# Patient Record
Sex: FEMALE | Race: WHITE | NOT HISPANIC OR LATINO
[De-identification: names, ages, dates, MRNs, and addresses within clinical notes are randomized per-mention and may not be internally consistent; named-entity substitution may affect disease eponyms.]

---

## 2017-04-20 ENCOUNTER — APPOINTMENT (OUTPATIENT)
Dept: PULMONOLOGY | Facility: CLINIC | Age: 52
End: 2017-04-20

## 2017-04-20 VITALS
DIASTOLIC BLOOD PRESSURE: 70 MMHG | BODY MASS INDEX: 19.33 KG/M2 | SYSTOLIC BLOOD PRESSURE: 110 MMHG | HEART RATE: 50 BPM | WEIGHT: 135 LBS | RESPIRATION RATE: 17 BRPM | HEIGHT: 70 IN | OXYGEN SATURATION: 100 %

## 2017-04-20 DIAGNOSIS — Z86.39 PERSONAL HISTORY OF OTHER ENDOCRINE, NUTRITIONAL AND METABOLIC DISEASE: ICD-10-CM

## 2017-04-20 RX ORDER — LEVOCETIRIZINE DIHYDROCHLORIDE 5 MG/1
5 TABLET ORAL
Qty: 90 | Refills: 2 | Status: ACTIVE | COMMUNITY
Start: 2017-04-20 | End: 1900-01-01

## 2017-05-31 ENCOUNTER — RX RENEWAL (OUTPATIENT)
Age: 52
End: 2017-05-31

## 2017-10-09 ENCOUNTER — RX RENEWAL (OUTPATIENT)
Age: 52
End: 2017-10-09

## 2017-11-17 ENCOUNTER — RX RENEWAL (OUTPATIENT)
Age: 52
End: 2017-11-17

## 2017-11-21 ENCOUNTER — APPOINTMENT (OUTPATIENT)
Dept: PULMONOLOGY | Facility: CLINIC | Age: 52
End: 2017-11-21
Payer: COMMERCIAL

## 2017-11-21 VITALS
HEART RATE: 66 BPM | SYSTOLIC BLOOD PRESSURE: 115 MMHG | OXYGEN SATURATION: 100 % | WEIGHT: 136 LBS | BODY MASS INDEX: 19.47 KG/M2 | RESPIRATION RATE: 14 BRPM | DIASTOLIC BLOOD PRESSURE: 65 MMHG | HEIGHT: 70 IN

## 2017-11-21 PROCEDURE — 95012 NITRIC OXIDE EXP GAS DETER: CPT

## 2017-11-21 PROCEDURE — 99214 OFFICE O/P EST MOD 30 MIN: CPT | Mod: 25

## 2017-11-21 PROCEDURE — 94010 BREATHING CAPACITY TEST: CPT

## 2017-11-21 RX ORDER — EPINEPHRINE 0.3 MG/.3ML
0.3 INJECTION INTRAMUSCULAR
Qty: 2 | Refills: 0 | Status: ACTIVE | COMMUNITY
Start: 2017-06-01

## 2017-11-21 RX ORDER — ESTRADIOL 0.1 MG/G
0.1 CREAM VAGINAL
Qty: 42 | Refills: 0 | Status: ACTIVE | COMMUNITY
Start: 2017-05-31

## 2017-11-21 RX ORDER — CEFADROXIL 1000 MG/1
1 TABLET ORAL
Qty: 10 | Refills: 0 | Status: DISCONTINUED | COMMUNITY
Start: 2017-06-05 | End: 2017-11-21

## 2017-11-21 RX ORDER — CEFADROXIL 500 MG/1
500 CAPSULE ORAL
Qty: 14 | Refills: 0 | Status: DISCONTINUED | COMMUNITY
Start: 2017-06-03 | End: 2017-11-21

## 2018-01-31 ENCOUNTER — MEDICATION RENEWAL (OUTPATIENT)
Age: 53
End: 2018-01-31

## 2018-04-18 ENCOUNTER — RX RENEWAL (OUTPATIENT)
Age: 53
End: 2018-04-18

## 2018-04-21 ENCOUNTER — RX RENEWAL (OUTPATIENT)
Age: 53
End: 2018-04-21

## 2018-05-22 ENCOUNTER — APPOINTMENT (OUTPATIENT)
Dept: PULMONOLOGY | Facility: CLINIC | Age: 53
End: 2018-05-22
Payer: COMMERCIAL

## 2018-05-22 ENCOUNTER — NON-APPOINTMENT (OUTPATIENT)
Age: 53
End: 2018-05-22

## 2018-05-22 VITALS
OXYGEN SATURATION: 99 % | SYSTOLIC BLOOD PRESSURE: 110 MMHG | DIASTOLIC BLOOD PRESSURE: 70 MMHG | BODY MASS INDEX: 20.62 KG/M2 | HEIGHT: 70 IN | WEIGHT: 144 LBS | RESPIRATION RATE: 14 BRPM | HEART RATE: 71 BPM

## 2018-05-22 PROCEDURE — 95012 NITRIC OXIDE EXP GAS DETER: CPT

## 2018-05-22 PROCEDURE — 99214 OFFICE O/P EST MOD 30 MIN: CPT | Mod: 25

## 2018-05-22 PROCEDURE — 94010 BREATHING CAPACITY TEST: CPT

## 2018-05-22 RX ORDER — MOMETASONE 50 UG/1
50 SPRAY, METERED NASAL TWICE DAILY
Qty: 3 | Refills: 1 | Status: ACTIVE | COMMUNITY
Start: 2018-05-22 | End: 1900-01-01

## 2018-05-22 RX ORDER — ZILEUTON 600 MG/1
600 TABLET, FILM COATED, EXTENDED RELEASE ORAL
Qty: 180 | Refills: 1 | Status: ACTIVE | COMMUNITY
Start: 2018-05-22 | End: 1900-01-01

## 2018-05-22 RX ORDER — OLOPATADINE HYDROCHLORIDE 665 UG/1
0.6 SPRAY, METERED NASAL
Qty: 3 | Refills: 1 | Status: ACTIVE | COMMUNITY
Start: 2018-05-22 | End: 1900-01-01

## 2018-05-22 RX ORDER — ALBUTEROL SULFATE 90 UG/1
108 (90 BASE) AEROSOL, METERED RESPIRATORY (INHALATION)
Qty: 3 | Refills: 1 | Status: ACTIVE | COMMUNITY
Start: 2018-05-22 | End: 1900-01-01

## 2018-06-08 ENCOUNTER — MEDICATION RENEWAL (OUTPATIENT)
Age: 53
End: 2018-06-08

## 2018-09-07 ENCOUNTER — MEDICATION RENEWAL (OUTPATIENT)
Age: 53
End: 2018-09-07

## 2018-09-26 ENCOUNTER — RX RENEWAL (OUTPATIENT)
Age: 53
End: 2018-09-26

## 2018-12-20 ENCOUNTER — RX RENEWAL (OUTPATIENT)
Age: 53
End: 2018-12-20

## 2019-01-16 ENCOUNTER — APPOINTMENT (OUTPATIENT)
Dept: PULMONOLOGY | Facility: CLINIC | Age: 54
End: 2019-01-16
Payer: COMMERCIAL

## 2019-01-16 ENCOUNTER — NON-APPOINTMENT (OUTPATIENT)
Age: 54
End: 2019-01-16

## 2019-01-16 VITALS
DIASTOLIC BLOOD PRESSURE: 72 MMHG | RESPIRATION RATE: 16 BRPM | HEART RATE: 63 BPM | SYSTOLIC BLOOD PRESSURE: 120 MMHG | WEIGHT: 143 LBS | OXYGEN SATURATION: 99 % | BODY MASS INDEX: 20.47 KG/M2 | HEIGHT: 70 IN

## 2019-01-16 PROCEDURE — 95012 NITRIC OXIDE EXP GAS DETER: CPT

## 2019-01-16 PROCEDURE — 99214 OFFICE O/P EST MOD 30 MIN: CPT | Mod: 25

## 2019-01-16 PROCEDURE — 94010 BREATHING CAPACITY TEST: CPT

## 2019-01-16 RX ORDER — INDACATEROL MALEATE 75 UG/1
75 CAPSULE ORAL; RESPIRATORY (INHALATION)
Qty: 3 | Refills: 1 | Status: DISCONTINUED | COMMUNITY
Start: 2018-05-22 | End: 2019-01-16

## 2019-01-16 RX ORDER — INDACATEROL MALEATE 75 UG/1
75 CAPSULE ORAL; RESPIRATORY (INHALATION)
Qty: 90 | Refills: 1 | Status: DISCONTINUED | COMMUNITY
Start: 2017-04-20 | End: 2019-01-16

## 2019-01-16 NOTE — REVIEW OF SYSTEMS
[Negative] : Sleep Disorder [As Noted in HPI] : as noted in HPI [Itchy Eyes] : itching of ~T the eyes

## 2019-01-16 NOTE — HISTORY OF PRESENT ILLNESS
[FreeTextEntry1] : Ms. Arellano is a 53 year old female presenting to the office for a follow up visit for allergic rhinitis, GERD, low vitamin D, asthma, and poor sleep. Her chief complaint is her sleep.\par -she states she recently returned from [place]\par -she notes her energy level is good\par -she notes her sleep is an issue, but has improved\par -she states her blood sugar is erratic, but has improved\par -she states she had itchy eyes and ears in [place]\par -she states her bowels are regular\par -she states her sinuses were congested in [place], but improved back in [place]\par -she states her diet is normal \par -she states her weight is stable\par -she notes she has been training for 3 months for race\par -she denies any coughing, wheezing, headaches, nausea, vomiting, fever, chills, sweats, chest pain, chest pressure, diarrhea, constipation, dysphagia, dizziness, leg swelling, leg pain, heartburn, reflux, or sour taste in the mouth.

## 2019-01-16 NOTE — ADDENDUM
[FreeTextEntry1] : Documented by Vj Teran acting as a scribe for Dr. Jeremy Jackson on 01/16/2019.\par \par All medical record entries made by the Scribe were at my, Dr. Jeremy Jackson's, direction and personally dictated by me on 01/16/2019. I have reviewed the chart and agree that the record accurately reflects my personal performance of the history, physical exam, assessment and plan. I have also personally directed, reviewed, and agree with the discharge instructions.

## 2019-01-16 NOTE — PROCEDURE
[FreeTextEntry1] : PFT-spi reveals normal flows with FEV1 of   3.14   , which is   95% of predicted, normal flow volume loop\par \par FENO was 17; a normal value being less than 25\par Fractional exhaled nitric oxide (FENO) is regarded as a simple, noninvasive method for assessing eosinophilic airway inflammation. Produced by a variety of cells within the lung, nitric oxide (NO) concentrations are generally low in healthy individuals. However, high concentrations of NO appear to be involved in nonspecific host defense mechanisms and chronic inflammatory diseases such as asthma. The American Thoracic Society (ATS) therefore has recommended using FENO to aid in the diagnosis and monitoring of eosinophilic airway inflammation and asthma, and for identifying steroid responsive individuals whose chronic respiratory symptoms may be caused by airway inflammation.

## 2019-01-16 NOTE — ASSESSMENT
[FreeTextEntry1] : Ms. Arellano is a 53 year old female with a history of asthma, allergy, GERD, and poor sleep doing well from a pulmonary perspective - allergies improved\par \par problem 1: moderate persistent asthma\par -add Utibron 1 puff BID\par -continue to use Xopenex PRN and before exercise\par -continue Alvesco 2 inhalations BID \par -continue Zyflo CR 1200 mg BID \par -Asthma is  believed to be caused by inherited (genetic) and environmental factor, but its exact cause is unknown. Asthma may be triggered by allergens, lung infections, or irritants in the air. Asthma triggers are different for each person \par -Inhaler technique reviewed as well as oral hygiene techniques reviewed with patient. Avoidance of cold air, extremes of temperature, rescue inhaler should be used before exercise. Order of medication reviewed with patient. Recommended use of a cool mist humidifier in the bedroom. \par \par problem 2: allergies\par -continue Claritin 10mg in the morning \par -can add OTC antihistamine PRN (Xyzal 5 mg QHS)\par -continue to use Qnasl 1 sniff each nostril BID\par -continue Olopatadine 0.6% 1 sniff/nostril BID  \par \par -Environmental measures for allergies were encouraged including mattress and pillow cover, air purifier, and environmental controls. \par \par problem 3: GERD\par -diet controlled \par -Rule of 2s: avoid eating too much, eating too late, eating too spicy, eating two hours before bed\par -Things to avoid including overeating, spicy foods, tight clothing, eating within three hours of bed, this list is not all inclusive. \par -For treatment of reflux, possible options discussed including diet control, H2 blockers, PPIs, as well as coating motility agents discussed as treatment options. Timing of meals and proximity of last meal to sleep were discussed. If symptoms persist, a formal gastrointestinal evaluation is needed. \par \par problem 4: low vitamin D\par -continue to use vitamin D therapy \par -Has been associated with asthma exacerbations and increased allergic symptoms. The goal based on recent information is maintaining levels between 50-70 and low normal is 30. Recommended 50,000 units every two weeks to once a month depending on the level. \par \par problem 5: poor sleep\par -recommended to try Sleep Guard by [employer]\par -Good sleep hygiene was encouraged including avoiding watching television an hour before bed, keeping caffeine at a low,  avoiding reading, television, or anything, in bed, no drinking any liquids three hours before bedtime, and only getting into bed when tired and ready for sleep.\par \par problem 6: health maintenance \par -recommended to try optimal electrolytes\par -recommended to take CoQ10 200mg QD \par -s/p influenza vaccine 2018\par -recommended strep pneumonia vaccines: Prevnar-13 vaccine, followed by Pneumo vaccine 23 one year following\par -recommended early intervention for URIs\par -recommended regular osteoporosis evaluations\par -recommended early dermatological evaluations\par -recommended after the age of 50 to the age of 70, colonoscopy every 5 years \par \par F/U in 6 months with [facility]\par She is encouraged to call with any changes, concerns, or questions.

## 2019-01-16 NOTE — REASON FOR VISIT
[Follow-Up] : a follow-up visit [FreeTextEntry1] : asthma, allergic rhinitis, GERD, low vitamin D, asthma, and poor sleep

## 2019-01-28 ENCOUNTER — MEDICATION RENEWAL (OUTPATIENT)
Age: 54
End: 2019-01-28

## 2019-02-18 ENCOUNTER — APPOINTMENT (OUTPATIENT)
Dept: PULMONOLOGY | Facility: CLINIC | Age: 54
End: 2019-02-18
Payer: COMMERCIAL

## 2019-02-18 ENCOUNTER — MEDICATION RENEWAL (OUTPATIENT)
Age: 54
End: 2019-02-18

## 2019-02-18 VITALS
DIASTOLIC BLOOD PRESSURE: 80 MMHG | HEART RATE: 75 BPM | BODY MASS INDEX: 21.18 KG/M2 | WEIGHT: 143 LBS | SYSTOLIC BLOOD PRESSURE: 123 MMHG | OXYGEN SATURATION: 98 % | HEIGHT: 69 IN

## 2019-02-18 PROCEDURE — 99214 OFFICE O/P EST MOD 30 MIN: CPT | Mod: 25

## 2019-02-18 PROCEDURE — 71046 X-RAY EXAM CHEST 2 VIEWS: CPT

## 2019-02-18 RX ORDER — METHYLPREDNISOLONE 4 MG/1
4 TABLET ORAL
Qty: 21 | Refills: 0 | Status: DISCONTINUED | COMMUNITY
Start: 2019-02-17

## 2019-02-18 RX ORDER — LEVALBUTEROL HYDROCHLORIDE 0.63 MG/3ML
0.63 SOLUTION RESPIRATORY (INHALATION)
Qty: 120 | Refills: 3 | Status: ACTIVE | COMMUNITY
Start: 2019-02-18 | End: 1900-01-01

## 2019-02-18 RX ORDER — PRASTERONE 6.5 MG/1
6.5 INSERT VAGINAL
Qty: 28 | Refills: 0 | Status: DISCONTINUED | COMMUNITY
Start: 2018-09-10

## 2019-02-18 RX ORDER — LEVALBUTEROL TARTRATE 45 UG/1
45 AEROSOL, METERED ORAL
Qty: 3 | Refills: 1 | Status: ACTIVE | COMMUNITY
Start: 2019-02-18 | End: 1900-01-01

## 2019-02-18 NOTE — HISTORY OF PRESENT ILLNESS
[FreeTextEntry1] : Ms. Arellano is a 53 year old female presenting to the office for a follow up visit for allergic rhinitis, GERD, low vitamin D, asthma, and poor sleep. Her chief complaint is SOB. \par - Recently she has been fairly active and has been traveling\par - She states that she has not been feeling well\par - She has been placed on Zithromax and nebulizer. \par -She continued to feel poor so she followed up at Urgent Care, where she was placed on prednisone \par - She feels that she is not getting her full breath \par - She has been having PND. \par - Her energy level is around 6-7\par - Her sense of smell and taste are fine \par - She has been using her nebulizer. She notes that it works but it does not last. \par - She denies any headaches, nausea, vomiting, fever, chills, sweats, palpitations, coughing, wheezing, diarrhea, constipation, dysphagia, myalgias, dizziness, leg swelling, leg pain, itchy eyes, itchy ears, heartburn, reflux, or sour taste in the mouth.

## 2019-02-18 NOTE — PROCEDURE
[FreeTextEntry1] : CXR revealed a normal sized heart; there was no evidence of infiltrate or effusion-- A normal chest radiograph.

## 2019-02-18 NOTE — ADDENDUM
[FreeTextEntry1] : Documented by Carrington Moon acting as a scribe for Dr. Jeremy Jackson on 2/18/2019\par \par All medical record entries made by the Scribe were at my, Dr. Jeremy Jackson's, direction and personally dictated by me on 2/18/2019. I have reviewed the chart and agree that the record accurately reflects my personal performance of the history, physical exam, assessment and plan. I have also personally directed, reviewed, and agree with the discharge instructions. \par \par \par \par \par

## 2019-02-18 NOTE — ASSESSMENT
[FreeTextEntry1] : Ms. Arellano is a 53 year old female with a history of asthma, allergy, GERD, and poor sleep. She presents to the office s/p sinusitis in the midst of an asthmatic bronchitis \par \par problem 1: moderate persistent asthma- flair\par - Add a course of prednisone: 20 mg 7 days and 10 mg for 7 days \par Information sheet given to the patient to be reviewed, this medication is never to be used without consulting the prescribing physician. Proper dietary restraint is necessary specifically salt containing foods, if any reaction may occur should be reported. \par \par - Add Xopenex 0.63 via nebulizer TID\par - Cotinue Utibron 1 puff BID\par -continue to use Xopenex PRN and before exercise\par -continue Alvesco 2 inhalations BID \par -continue Zyflo CR 1200 mg BID \par -Asthma is  believed to be caused by inherited (genetic) and environmental factor, but its exact cause is unknown. Asthma may be triggered by allergens, lung infections, or irritants in the air. Asthma triggers are different for each person \par -Inhaler technique reviewed as well as oral hygiene techniques reviewed with patient. Avoidance of cold air, extremes of temperature, rescue inhaler should be used before exercise. Order of medication reviewed with patient. Recommended use of a cool mist humidifier in the bedroom. \par \par problem 2: allergies\par -continue Claritin 10mg in the morning \par -can add OTC antihistamine PRN (Xyzal 5 mg QHS)\par -continue to use Qnasl 1 sniff each nostril BID\par -continue Olopatadine 0.6% 1 sniff/nostril BID  \par \par -Environmental measures for allergies were encouraged including mattress and pillow cover, air purifier, and environmental controls. \par \par problem 3: GERD\par -diet controlled \par -Rule of 2s: avoid eating too much, eating too late, eating too spicy, eating two hours before bed\par -Things to avoid including overeating, spicy foods, tight clothing, eating within three hours of bed, this list is not all inclusive. \par -For treatment of reflux, possible options discussed including diet control, H2 blockers, PPIs, as well as coating motility agents discussed as treatment options. Timing of meals and proximity of last meal to sleep were discussed. If symptoms persist, a formal gastrointestinal evaluation is needed. \par \par problem 4: low vitamin D\par -continue to use vitamin D therapy \par -Has been associated with asthma exacerbations and increased allergic symptoms. The goal based on recent information is maintaining levels between 50-70 and low normal is 30. Recommended 50,000 units every two weeks to once a month depending on the level. \par \par problem 5: poor sleep\par -recommended to try Sleep Guard by [employer]\par -Good sleep hygiene was encouraged including avoiding watching television an hour before bed, keeping caffeine at a low,  avoiding reading, television, or anything, in bed, no drinking any liquids three hours before bedtime, and only getting into bed when tired and ready for sleep.\par \par problem 6: health maintenance \par -recommended to try optimal electrolytes\par -recommended to take CoQ10 200mg QD \par -s/p influenza vaccine 2018\par -recommended strep pneumonia vaccines: Prevnar-13 vaccine, followed by Pneumo vaccine 23 one year following\par -recommended early intervention for URIs\par -recommended regular osteoporosis evaluations\par -recommended early dermatological evaluations\par -recommended after the age of 50 to the age of 70, colonoscopy every 5 years \par \par F/U in 6 months with [facility] MOCTEZU[facility]\par She is encouraged to call with any changes, concerns, or questions.

## 2019-03-21 ENCOUNTER — RX RENEWAL (OUTPATIENT)
Age: 54
End: 2019-03-21

## 2019-05-24 ENCOUNTER — RX RENEWAL (OUTPATIENT)
Age: 54
End: 2019-05-24

## 2019-09-18 ENCOUNTER — APPOINTMENT (OUTPATIENT)
Dept: PULMONOLOGY | Facility: CLINIC | Age: 54
End: 2019-09-18
Payer: COMMERCIAL

## 2019-09-18 ENCOUNTER — TRANSCRIPTION ENCOUNTER (OUTPATIENT)
Age: 54
End: 2019-09-18

## 2019-09-18 ENCOUNTER — NON-APPOINTMENT (OUTPATIENT)
Age: 54
End: 2019-09-18

## 2019-09-18 VITALS
HEART RATE: 60 BPM | RESPIRATION RATE: 17 BRPM | SYSTOLIC BLOOD PRESSURE: 120 MMHG | DIASTOLIC BLOOD PRESSURE: 80 MMHG | HEIGHT: 69 IN | WEIGHT: 142 LBS | OXYGEN SATURATION: 98 % | BODY MASS INDEX: 21.03 KG/M2

## 2019-09-18 PROCEDURE — 99214 OFFICE O/P EST MOD 30 MIN: CPT | Mod: 25

## 2019-09-18 PROCEDURE — 94010 BREATHING CAPACITY TEST: CPT

## 2019-09-18 PROCEDURE — 95012 NITRIC OXIDE EXP GAS DETER: CPT

## 2019-09-18 RX ORDER — PREDNISONE 10 MG/1
10 TABLET ORAL
Qty: 50 | Refills: 0 | Status: DISCONTINUED | COMMUNITY
Start: 2019-02-18 | End: 2019-09-18

## 2019-09-18 NOTE — PHYSICAL EXAM
[General Appearance - Well Developed] : well developed [Well Groomed] : well groomed [Normal Appearance] : normal appearance [General Appearance - Well Nourished] : well nourished [No Deformities] : no deformities [General Appearance - In No Acute Distress] : no acute distress [Normal Conjunctiva] : the conjunctiva exhibited no abnormalities [Eyelids - No Xanthelasma] : the eyelids demonstrated no xanthelasmas [II] : II [Normal Oropharynx] : normal oropharynx [Neck Appearance] : the appearance of the neck was normal [Neck Cervical Mass (___cm)] : no neck mass was observed [Jugular Venous Distention Increased] : there was no jugular-venous distention [Thyroid Diffuse Enlargement] : the thyroid was not enlarged [Thyroid Nodule] : there were no palpable thyroid nodules [Heart Rate And Rhythm] : heart rate and rhythm were normal [Heart Sounds] : normal S1 and S2 [Murmurs] : no murmurs present [Respiration, Rhythm And Depth] : normal respiratory rhythm and effort [Exaggerated Use Of Accessory Muscles For Inspiration] : no accessory muscle use [Auscultation Breath Sounds / Voice Sounds] : lungs were clear to auscultation bilaterally [Abdomen Soft] : soft [Abdomen Tenderness] : non-tender [Abdomen Mass (___ Cm)] : no abdominal mass palpated [Abnormal Walk] : normal gait [Gait - Sufficient For Exercise Testing] : the gait was sufficient for exercise testing [Nail Clubbing] : no clubbing of the fingernails [Cyanosis, Localized] : no localized cyanosis [Petechial Hemorrhages (___cm)] : no petechial hemorrhages [] : no ischemic changes [Skin Color & Pigmentation] : normal skin color and pigmentation [No Venous Stasis] : no venous stasis [Skin Lesions] : no skin lesions [No Skin Ulcers] : no skin ulcer [No Xanthoma] : no  xanthoma was observed [Deep Tendon Reflexes (DTR)] : deep tendon reflexes were 2+ and symmetric [No Focal Deficits] : no focal deficits [Sensation] : the sensory exam was normal to light touch and pinprick [Oriented To Time, Place, And Person] : oriented to person, place, and time [Impaired Insight] : insight and judgment were intact [Affect] : the affect was normal [FreeTextEntry1] : I:E 1:3, clear

## 2019-09-18 NOTE — ADDENDUM
[FreeTextEntry1] : All medical record entries made by marco Hagen were at Dr. Jeremy Jackson's direction and personally dictated by me on 09/18/2019. I have reviewed the chart and agree that the record accurately reflects my personal performance of the history, physical exam, assessment and plan. I have also personally directed, reviewed, and agree with the discharge instructions.

## 2019-09-18 NOTE — PROCEDURE
[FreeTextEntry1] : PFT - spi reveals normal flows; FEV1 is 3.42 which is 108% of predicted, normal flow volume loop \par \par FENO was 16; a normal value being less than 25\par \par Fractional exhaled nitric oxide (FENO) is regarded as a simple, noninvasive method for assessing eosinophilic airway inflammation. Produced by a variety of cells within the lung, nitric oxide (NO) concentrations are generally low in healthy individuals. However, high concentrations of NO appear to be involved in nonspecific host defense mechanisms and chronic inflammatory diseases such as asthma. The American Thoracic Society (ATS) therefore has recommended using FENO to aid in the diagnosis and monitoring of eosinophilic airway inflammation and asthma, and for identifying steroid responsive individuals whose chronic respiratory symptoms may be caused by airway inflammation.

## 2019-09-18 NOTE — HISTORY OF PRESENT ILLNESS
[FreeTextEntry1] : Ms. Arellano is a 53 year old female presenting to the office for a follow up visit for allergic rhinitis, GERD, low vitamin D, asthma, and poor sleep. She offers no chief complaint at this time. \par -She states that she has generally been feeling well \par -she reports that she got a URI toward the end of the Summer, however has been feeling fine since\par -she notes that she has been exercising regularly with swimming in the Summer and running in the Winter\par -she states that her bowels have been regular\par -she reports that her sense of taste and smell have been good\par -her blood sugar / A1c has been under control\par -her allergies have been under control and she has been using Olopatadine regularly/seeing benefits\par -she denies any headaches, nausea, vomiting, fever, chills, sweats, chest pain, chest pressure, diarrhea, constipation, dysphagia, dizziness, leg swelling, leg pain, itchy eyes, itchy ears, heartburn, reflux, or sour taste in the mouth, palpitations.

## 2019-09-18 NOTE — ASSESSMENT
[FreeTextEntry1] : Ms. Arellano is a 53 year old female with a history of asthma, allergy, GERD, and poor sleep. She is currently stable from a pulmonary perspective and feeling well.\par \par problem 1: moderate persistent asthma\par -continue Xopenex 0.63 via nebulizer TID\par -continue Utibron 1 puff BID\par -continue Xopenex 2 puffs Q6H, pre-exercise \par -continue Alvesco 2 inhalations BID \par -continue Zyflo CR 1200 mg BID \par -Asthma is  believed to be caused by inherited (genetic) and environmental factor, but its exact cause is unknown. Asthma may be triggered by allergens, lung infections, or irritants in the air. Asthma triggers are different for each person \par -Inhaler technique reviewed as well as oral hygiene techniques reviewed with patient. Avoidance of cold air, extremes of temperature, rescue inhaler should be used before exercise. Order of medication reviewed with patient. Recommended use of a cool mist humidifier in the bedroom. \par \par problem 2: allergies\par -continue Claritin 10mg in the morning \par -continue OTC antihistamine PRN (Xyzal 5 mg QHS)\par -continue to use Qnasl 1 sniff each nostril BID\par -continue Olopatadine 0.6% 1 sniff/nostril BID  \par \par -Environmental measures for allergies were encouraged including mattress and pillow cover, air purifier, and environmental controls. \par \par problem 3: GERD\par -diet controlled \par -Rule of 2s: avoid eating too much, eating too late, eating too spicy, eating two hours before bed\par -Things to avoid including overeating, spicy foods, tight clothing, eating within three hours of bed, this list is not all inclusive. \par -For treatment of reflux, possible options discussed including diet control, H2 blockers, PPIs, as well as coating motility agents discussed as treatment options. Timing of meals and proximity of last meal to sleep were discussed. If symptoms persist, a formal gastrointestinal evaluation is needed. \par \par problem 4: low vitamin D\par -continue to use vitamin D therapy \par -Has been associated with asthma exacerbations and increased allergic symptoms. The goal based on recent information is maintaining levels between 50-70 and low normal is 30. Recommended 50,000 units every two weeks to once a month depending on the level. \par \par problem 5: poor sleep\par -recommended to try Sleep Guard by [employer]\par -Good sleep hygiene was encouraged including avoiding watching television an hour before bed, keeping caffeine at a low,  avoiding reading, television, or anything, in bed, no drinking any liquids three hours before bedtime, and only getting into bed when tired and ready for sleep.\par \par problem 6: health maintenance \par -recommended to try optimal electrolytes\par -recommended to take CoQ10 200mg QD \par -s/p influenza vaccine 2018\par -recommended strep pneumonia vaccines: Prevnar-13 vaccine, followed by Pneumo vaccine 23 one year following\par -recommended early intervention for URIs\par -recommended regular osteoporosis evaluations\par -recommended early dermatological evaluations\par -recommended after the age of 50 to the age of 70, colonoscopy every 5 years \par \par F/U in 6 months with [facility]\par She is encouraged to call with any changes, concerns, or questions.

## 2019-09-26 ENCOUNTER — RESULT REVIEW (OUTPATIENT)
Age: 54
End: 2019-09-26

## 2019-11-23 ENCOUNTER — RX RENEWAL (OUTPATIENT)
Age: 54
End: 2019-11-23

## 2019-12-27 ENCOUNTER — RX RENEWAL (OUTPATIENT)
Age: 54
End: 2019-12-27

## 2020-01-14 ENCOUNTER — RX RENEWAL (OUTPATIENT)
Age: 55
End: 2020-01-14

## 2020-01-27 ENCOUNTER — NON-APPOINTMENT (OUTPATIENT)
Age: 55
End: 2020-01-27

## 2020-01-27 ENCOUNTER — APPOINTMENT (OUTPATIENT)
Dept: PULMONOLOGY | Facility: CLINIC | Age: 55
End: 2020-01-27
Payer: COMMERCIAL

## 2020-01-27 VITALS
DIASTOLIC BLOOD PRESSURE: 80 MMHG | OXYGEN SATURATION: 98 % | WEIGHT: 143 LBS | BODY MASS INDEX: 21.18 KG/M2 | SYSTOLIC BLOOD PRESSURE: 110 MMHG | HEART RATE: 68 BPM | RESPIRATION RATE: 17 BRPM | HEIGHT: 69 IN

## 2020-01-27 DIAGNOSIS — R09.89 OTHER SPECIFIED SYMPTOMS AND SIGNS INVOLVING THE CIRCULATORY AND RESPIRATORY SYSTEMS: ICD-10-CM

## 2020-01-27 PROCEDURE — 99214 OFFICE O/P EST MOD 30 MIN: CPT | Mod: 25

## 2020-01-27 PROCEDURE — 94010 BREATHING CAPACITY TEST: CPT

## 2020-01-27 PROCEDURE — 71046 X-RAY EXAM CHEST 2 VIEWS: CPT

## 2020-01-27 RX ORDER — LEVALBUTEROL HYDROCHLORIDE 0.63 MG/3ML
0.63 SOLUTION RESPIRATORY (INHALATION)
Qty: 1 | Refills: 2 | Status: ACTIVE | COMMUNITY
Start: 2020-01-27 | End: 1900-01-01

## 2020-01-27 NOTE — PROCEDURE
[FreeTextEntry1] : CXR in office; Read by Dr. Jackson. \par Impression: Normal appearing heart. No infiltrates, effusion or opacities noted. Mild scoliosis appreciated.\par \par SPI: FEV1 - 91%\par

## 2020-01-27 NOTE — HISTORY OF PRESENT ILLNESS
[TextBox_4] : Ms. Arellano is a 54 year old female presenting to the office for an acute visit.\par She has history of allergic rhinitis, GERD, low vitamin D, asthma, and poor sleep.\par \par Her CC is productive cough x 7 days.\par Pt states that she has been sick all of January.  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  She started out with a stomach virus when in [place], that has since resolved, but now has developed a chest infection that started on 1/21 when she left for [place].\par Pt. returned last night and started on a Z-Pack as RX'ed from another Provider. Today is day 2; she took 250 mg today.\par Pt. states she had a fever x 1 night on 1/25, but not since.  She continues to cough up mucus that is green-yellow in color.  She notes when she coughs it's coughing fits, her throat feels irritated because of it. She also c/o chest congestion, nasal congestion, fatigue, decreased appetite, and post nasal drip. She has had some recently CROW, but states when she was in [place] the elevator for the Tube was broken and she had to walk up 100+ stairs resulting in the dyspnea episode.\par \par She is currently on Utibron and Alvesco.  She has not used her Xopenex rescue inhaler recently. \par She denies wheezing, SOB @ rest, sinus pressure/congestion, palpitations, dizziness or N/V/D.

## 2020-01-27 NOTE — PHYSICAL EXAM
[No Acute Distress] : no acute distress [Well Nourished] : well nourished [No Deformities] : no deformities [Well Developed] : well developed [Normal Oropharynx] : normal oropharynx [Normal Appearance] : normal appearance [Normal Rate/Rhythm] : normal rate/rhythm [Normal S1, S2] : normal s1, s2 [No Murmurs] : no murmurs [No Resp Distress] : no resp distress [No Abnormalities] : no abnormalities [Normal Gait] : normal gait [No Clubbing] : no clubbing [No Edema] : no edema [Normal Color/ Pigmentation] : normal color/ pigmentation [Oriented x3] : oriented x3 [Normal Affect] : normal affect [TextBox_68] : RLL Forced expiratory wheeze auscultated. All other lobes CTA.

## 2020-01-27 NOTE — ASSESSMENT
[FreeTextEntry1] : The plan for the patient is as follows:\par \par 1. Acute Bronchitis\par - Meliton another 250mg from pre-RX'ed Z-Pack to total dose of 500 mg today.\par - Then Discontinue Z-Pack; add Azithromycin 500 mg PO x 3 more days.\par \par 2. Moderate, persistent asthma:\par - Add use of Xopenex inhaler 2 puffs Q6H, especially before ICS inhaler for the next week.\par - Continue Utibron neohaler; 1 puff BID.\par - Pt. states Alvesco inhaler is no longer covered by insurance - Pt. to add QVAr 2 puffs BID; rinse and gargle after use.  Patient to contact office with list of ICS her insurance will cover - if QVar isn't covered. \par \par 3. Chest Congestion\par - Add Mucinex OTC Q12H \par \par 4. Cough\par - CXR in office WNL.\par \par 5. Post-nasal drip\par - Continue Olopatadine 0.6 nasal spray; 1 spray each nostril BID. \par \par

## 2020-01-27 NOTE — REVIEW OF SYSTEMS
[Fever] : fever [Fatigue] : fatigue [Poor Appetite] : poor appetite [Nasal Congestion] : nasal congestion [Postnasal Drip] : postnasal drip [Cough] : cough [Sputum] : sputum [Dyspnea] : dyspnea [Negative] : Endocrine [Sore Throat] : no sore throat [Sinus Problems] : no sinus problems [Chest Tightness] : no chest tightness [Wheezing] : no wheezing

## 2020-02-06 ENCOUNTER — TRANSCRIPTION ENCOUNTER (OUTPATIENT)
Age: 55
End: 2020-02-06

## 2020-02-06 RX ORDER — PREDNISONE 10 MG/1
10 TABLET ORAL
Qty: 60 | Refills: 0 | Status: DISCONTINUED | COMMUNITY
Start: 2020-02-05 | End: 2020-02-06

## 2020-02-25 ENCOUNTER — TRANSCRIPTION ENCOUNTER (OUTPATIENT)
Age: 55
End: 2020-02-25

## 2020-03-18 ENCOUNTER — APPOINTMENT (OUTPATIENT)
Dept: PULMONOLOGY | Facility: CLINIC | Age: 55
End: 2020-03-18
Payer: COMMERCIAL

## 2020-03-18 VITALS
HEIGHT: 69 IN | SYSTOLIC BLOOD PRESSURE: 102 MMHG | RESPIRATION RATE: 17 BRPM | BODY MASS INDEX: 21.09 KG/M2 | TEMPERATURE: 97.7 F | HEART RATE: 57 BPM | OXYGEN SATURATION: 99 % | WEIGHT: 142.4 LBS | DIASTOLIC BLOOD PRESSURE: 60 MMHG

## 2020-03-18 DIAGNOSIS — Z87.09 PERSONAL HISTORY OF OTHER DISEASES OF THE RESPIRATORY SYSTEM: ICD-10-CM

## 2020-03-18 PROCEDURE — 99214 OFFICE O/P EST MOD 30 MIN: CPT | Mod: 25

## 2020-03-18 RX ORDER — AZITHROMYCIN 500 MG/1
500 TABLET, FILM COATED ORAL DAILY
Qty: 3 | Refills: 0 | Status: DISCONTINUED | COMMUNITY
Start: 2020-01-27 | End: 2020-03-18

## 2020-03-18 RX ORDER — CICLESONIDE 160 UG/1
160 AEROSOL, METERED RESPIRATORY (INHALATION) TWICE DAILY
Qty: 3 | Refills: 1 | Status: ACTIVE | COMMUNITY
Start: 2020-03-18 | End: 1900-01-01

## 2020-03-18 NOTE — ASSESSMENT
[FreeTextEntry1] : Ms. Arellano is a 55 year old female with a history of asthma, allergy, GERD, DM, Hypothyroid, and poor sleep. She is currently stable from a pulmonary perspective and feeling well. s/p Bronchitis. \par \par problem 1: moderate persistent asthma\par -continue Xopenex 0.63 via nebulizer TID\par -continue Utibron 1 puff BID\par -continue Xopenex 2 puffs Q6H, pre-exercise \par -continue Qvar 80mg 2 inhalations BID (better w/ Alvesco) \par -continue Zyflo CR 1200 mg BID \par -Asthma is  believed to be caused by inherited (genetic) and environmental factor, but its exact cause is unknown. Asthma may be triggered by allergens, lung infections, or irritants in the air. Asthma triggers are different for each person \par -Inhaler technique reviewed as well as oral hygiene techniques reviewed with patient. Avoidance of cold air, extremes of temperature, rescue inhaler should be used before exercise. Order of medication reviewed with patient. Recommended use of a cool mist humidifier in the bedroom. \par \par problem 2: allergies (stable) \par -continue Claritin 10mg in the morning \par -continue OTC antihistamine PRN (Xyzal 5 mg QHS)\par -continue to use Qnasl 1 sniff each nostril BID\par -continue Olopatadine 0.6% 1 sniff/nostril BID  \par \par -Environmental measures for allergies were encouraged including mattress and pillow cover, air purifier, and environmental controls. \par \par problem 3: GERD\par -diet controlled \par -Rule of 2s: avoid eating too much, eating too late, eating too spicy, eating two hours before bed\par -Things to avoid including overeating, spicy foods, tight clothing, eating within three hours of bed, this list is not all inclusive. \par -For treatment of reflux, possible options discussed including diet control, H2 blockers, PPIs, as well as coating motility agents discussed as treatment options. Timing of meals and proximity of last meal to sleep were discussed. If symptoms persist, a formal gastrointestinal evaluation is needed. \par \par problem 4: low vitamin D\par -continue to use vitamin D therapy \par -Has been associated with asthma exacerbations and increased allergic symptoms. The goal based on recent information is maintaining levels between 50-70 and low normal is 30. Recommended 50,000 units every two weeks to once a month depending on the level. \par \par problem 5: poor sleep\par -recommended to try Sleep Guard by [employer]\par -Good sleep hygiene was encouraged including avoiding watching television an hour before bed, keeping caffeine at a low,  avoiding reading, television, or anything, in bed, no drinking any liquids three hours before bedtime, and only getting into bed when tired and ready for sleep.\par \par problem 6: health maintenance \par -recommended to try optimal electrolytes\par -recommended to take CoQ10 200mg QD \par -s/p influenza vaccine 2019\par -recommended strep pneumonia vaccines: Prevnar-13 vaccine, followed by Pneumo vaccine 23 one year following\par -recommended early intervention for URIs\par -recommended regular osteoporosis evaluations\par -recommended early dermatological evaluations\par -recommended after the age of 50 to the age of 70, colonoscopy every 5 years \par \par F/U in 6 months with [facility]\par She is encouraged to call with any changes, concerns, or questions.

## 2020-03-18 NOTE — ADDENDUM
[FreeTextEntry1] : Documented by Ronnie Meeks acting as a scribe for Dr. Jeremy Jackson on 03/18/2020 \par \par All medical record entries made by the Scribe were at my, Dr. Jeremy Jackson's, direction and personally dictated by me on 03/18/2020 . I have reviewed the chart and agree that the record accurately reflects my personal performance of the history, physical exam, assessment and plan. I have also personally directed, reviewed, and agree with the discharge instructions.

## 2020-03-18 NOTE — HISTORY OF PRESENT ILLNESS
[FreeTextEntry1] : Ms. Arellano is a 55 year old female presenting to the office for a follow up visit for allergic rhinitis, GERD, low vitamin D, asthma, and poor sleep. Her chief complaint is \par -has been feeling better after her recent illness. \par -he reports that she is not getting enough sleep due to the stress load. \par -she notes that the Qvar is not working as well as the Alvesco.\par -sinuses are good but with the allergy season coming up, they might become active. \par -She reports that She is not using any new medication, vitamins, or supplements. \par \par -She denies any chest pain, chest pressure, diarrhea, constipation, dysphagia, dizziness, sour taste in the mouth, leg swelling, leg pain, itchy eyes, itchy ears, heartburn, reflux, myalgias or arthralgias.

## 2020-03-23 ENCOUNTER — TRANSCRIPTION ENCOUNTER (OUTPATIENT)
Age: 55
End: 2020-03-23

## 2020-03-24 RX ORDER — CICLESONIDE 160 UG/1
160 AEROSOL, METERED RESPIRATORY (INHALATION)
Qty: 18.3 | Refills: 1 | Status: DISCONTINUED | COMMUNITY
Start: 2017-04-20 | End: 2020-03-24

## 2020-04-30 ENCOUNTER — RX RENEWAL (OUTPATIENT)
Age: 55
End: 2020-04-30

## 2020-09-24 ENCOUNTER — APPOINTMENT (OUTPATIENT)
Dept: PULMONOLOGY | Facility: CLINIC | Age: 55
End: 2020-09-24
Payer: COMMERCIAL

## 2020-09-24 VITALS
DIASTOLIC BLOOD PRESSURE: 70 MMHG | WEIGHT: 145 LBS | TEMPERATURE: 97.1 F | HEART RATE: 56 BPM | OXYGEN SATURATION: 97 % | SYSTOLIC BLOOD PRESSURE: 120 MMHG | BODY MASS INDEX: 21.48 KG/M2 | RESPIRATION RATE: 17 BRPM | HEIGHT: 69 IN

## 2020-09-24 PROCEDURE — 99214 OFFICE O/P EST MOD 30 MIN: CPT | Mod: 25

## 2020-09-24 PROCEDURE — 95012 NITRIC OXIDE EXP GAS DETER: CPT

## 2020-09-24 NOTE — PROCEDURE
[FreeTextEntry1] :  FENO was 35; normal value being less than 25\par Fractional exhaled nitric oxide (FENO) is regarded as a simple, noninvasive method for assessing eosinophilic airway inflammation. Produced by a variety of cells within the lung, nitric oxide (NO) concentrations are generally low in healthy individuals. However, high concentrations of NO appear to be involved in nonspecific host defense mechanisms and chronic inflammatory diseases such as asthma. The American Thoracic Society (ATS) therefore has recommended using FENO to aid in the diagnosis and monitoring of eosinophilic airway inflammation and asthma, and for identifying steroid responsive individuals whose chronic respiratory symptoms may be airway inflammation.\par

## 2020-09-24 NOTE — ADDENDUM
[FreeTextEntry1] : Documented by Yasmin Horowitz acting as a scribe for Dr. Jeremy Jackson on 09/24/2020 \par \par All medical record entries made by the Scribe were at my, Dr. Jeremy Jackson's, direction and personally dictated by me on 09/24/2020 . I have reviewed the chart and agree that the record accurately reflects my personal performance of the history, physical exam, assessment and plan. I have also personally directed, reviewed, and agree with the discharge instructions.

## 2020-09-24 NOTE — HISTORY OF PRESENT ILLNESS
[FreeTextEntry1] : Ms. Arellano is a 55 year old female presenting to the office for a follow up visit for allergic rhinitis, GERD, low vitamin D, asthma, and poor sleep. Her chief complaint is \par - she has been feeling good, except every time she goes running her nose starts to drip. \par - no chest pain / pressure\par - bowel movements are regular\par - weight is stable \par - blood sugar is good \par - her sleep has been good \par - she has been biking, running, and paddle boarding whenever she can for exercise. \par - denies taking any new medications, vitamins, or supplements. ; except [place] Utibron [place] the [place] so she needs a different medication for that. \par - she has been using olopatadine\par - She  denies any visual issues, headaches, nausea, vomiting, fever, chills, sweats, chest pains, chest pressure, diarrhea, constipation, dysphagia, myalgia, dizziness, leg swelling, leg pain, itchy eyes, itchy ears, heartburn, reflux, or sour taste in the mouth.

## 2020-09-24 NOTE — ASSESSMENT
[FreeTextEntry1] : Ms. Arellano is a 55 year old female with a history of asthma, allergy, GERD, DM, Hypothyroid, and poor sleep. She is currently stable from a pulmonary perspective and feeling well.\par \par problem 1: moderate persistent asthma\par -continue Xopenex 0.63 via nebulizer TID\par -change Utibron 1 puff BID - to Stiolto 1 q day \par -continue Xopenex 2 puffs Q6H, pre-exercise \par -continue Qvar 80mg 2 inhalations BID (better w/ Alvesco) \par -continue Zyflo CR 1200 mg BID \par -Asthma is  believed to be caused by inherited (genetic) and environmental factor, but its exact cause is unknown. Asthma may be triggered by allergens, lung infections, or irritants in the air. Asthma triggers are different for each person \par -Inhaler technique reviewed as well as oral hygiene techniques reviewed with patient. Avoidance of cold air, extremes of temperature, rescue inhaler should be used before exercise. Order of medication reviewed with patient. Recommended use of a cool mist humidifier in the bedroom. \par \par problem 2: allergies (Active)\par -continue Claritin 10mg in the morning \par -continue OTC antihistamine PRN (Xyzal 5 mg QHS)\par -continue to use Qnasl 1 sniff each nostril BID\par -continue Olopatadine 0.6% 1 sniff/nostril BID  \par \par -Environmental measures for allergies were encouraged including mattress and pillow cover, air purifier, and environmental controls. \par \par problem 3: GERD\par -diet controlled \par -Rule of 2s: avoid eating too much, eating too late, eating too spicy, eating two hours before bed\par -Things to avoid including overeating, spicy foods, tight clothing, eating within three hours of bed, this list is not all inclusive. \par -For treatment of reflux, possible options discussed including diet control, H2 blockers, PPIs, as well as coating motility agents discussed as treatment options. Timing of meals and proximity of last meal to sleep were discussed. If symptoms persist, a formal gastrointestinal evaluation is needed. \par \par problem 4: low vitamin D\par -continue to use vitamin D therapy \par -Has been associated with asthma exacerbations and increased allergic symptoms. The goal based on recent information is maintaining levels between 50-70 and low normal is 30. Recommended 50,000 units every two weeks to once a month depending on the level. \par \par problem 5: poor sleep\par -recommended to try Sleep Guard by [employer]\par -Good sleep hygiene was encouraged including avoiding watching television an hour before bed, keeping caffeine at a low,  avoiding reading, television, or anything, in bed, no drinking any liquids three hours before bedtime, and only getting into bed when tired and ready for sleep.\par \par problem 6: health maintenance \par -recommended to try optimal electrolytes\par -recommended to take CoQ10 200mg QD \par -s/p influenza vaccine 2020\par -recommended strep pneumonia vaccines: Prevnar-13 vaccine, followed by Pneumo vaccine 23 one year following\par -recommended early intervention for URIs\par -recommended regular osteoporosis evaluations\par -recommended early dermatological evaluations\par -recommended after the age of 50 to the age of 70, colonoscopy every 5 years \par \par F/U in 6 months with [facility]\par She is encouraged to call with any changes, concerns, or questions.

## 2020-12-25 ENCOUNTER — RX RENEWAL (OUTPATIENT)
Age: 55
End: 2020-12-25

## 2021-02-22 ENCOUNTER — TRANSCRIPTION ENCOUNTER (OUTPATIENT)
Age: 56
End: 2021-02-22

## 2021-03-10 DIAGNOSIS — Z01.812 ENCOUNTER FOR PREPROCEDURAL LABORATORY EXAMINATION: ICD-10-CM

## 2021-03-16 LAB — SARS-COV-2 N GENE NPH QL NAA+PROBE: NOT DETECTED

## 2021-03-19 ENCOUNTER — NON-APPOINTMENT (OUTPATIENT)
Age: 56
End: 2021-03-19

## 2021-03-19 ENCOUNTER — APPOINTMENT (OUTPATIENT)
Dept: PULMONOLOGY | Facility: CLINIC | Age: 56
End: 2021-03-19
Payer: COMMERCIAL

## 2021-03-19 VITALS
BODY MASS INDEX: 21.77 KG/M2 | DIASTOLIC BLOOD PRESSURE: 76 MMHG | HEART RATE: 68 BPM | OXYGEN SATURATION: 98 % | WEIGHT: 147 LBS | RESPIRATION RATE: 17 BRPM | HEIGHT: 69 IN | SYSTOLIC BLOOD PRESSURE: 116 MMHG | TEMPERATURE: 97.3 F

## 2021-03-19 PROCEDURE — 95012 NITRIC OXIDE EXP GAS DETER: CPT

## 2021-03-19 PROCEDURE — 94010 BREATHING CAPACITY TEST: CPT

## 2021-03-19 PROCEDURE — 94729 DIFFUSING CAPACITY: CPT

## 2021-03-19 PROCEDURE — 99214 OFFICE O/P EST MOD 30 MIN: CPT | Mod: 25

## 2021-03-19 PROCEDURE — 94727 GAS DIL/WSHOT DETER LNG VOL: CPT

## 2021-03-19 PROCEDURE — 99072 ADDL SUPL MATRL&STAF TM PHE: CPT

## 2021-03-19 RX ORDER — LEVALBUTEROL TARTRATE 45 UG/1
45 AEROSOL, METERED ORAL
Qty: 3 | Refills: 1 | Status: ACTIVE | COMMUNITY
Start: 2021-03-19 | End: 1900-01-01

## 2021-03-19 NOTE — PROCEDURE
[FreeTextEntry1] : Feno was 15 ; a normal value being less than 25. Fractional exhaled nitric oxide (FENO) is regarded as a simple, noninvasive method for assessing eosinophilic airway inflammation. Produced by a variety of cells within the lung, nitric oxide (NO) concentrations are generally low in healthy individuals. However, high concentrations of NO appear to be involved in nonspecific host defense mechanisms and chronic inflammatory  diseases such as asthma. The American Thoracic Society (ATS) therefore recommended using FENO to aid in the diagnosis and monitoring of eosinophilic airway inflammation and asthma, and for identifying steroid responsive individuals whose chronic respiratory symptoms may be caused by airway inflammation \par \par PFT revealed normal flows, with a FEV1 of 3.16L, which is 89% of predicted, with a normal flow volume loop\par  \par \par -Images and procedures reviewed in detail and discussed with patient.

## 2021-03-19 NOTE — HISTORY OF PRESENT ILLNESS
[FreeTextEntry1] : Ms. Arellano is a 56 year old female presenting to the office for a follow up visit for allergic rhinitis, GERD, low vitamin D, asthma, and poor sleep. Her chief complaint is \par -She notes having issues running with her energy level, feeling SOB on inclines \par - S/p Covid 19 vaccine (Pfizer) \par -She notes having some indigestion that is intermittent, which will be active for 2-3 days then will disappear\par -She notes her sleep is inconsistent, occasionally having issues getting to sleep\par -She notes her weight is stable \par -She notes her sinuses are drippy \par -she notes using Xopenex before she runs \par -\par \par - patient denies any headaches, nausea, vomiting, fever, chills, sweats, chest pain, chest pressure, palpitations,coughing, wheezing, fatigue, diarrhea, constipation, dysphagia, myalgias, dizziness, leg swelling, leg pain, itchy eyes, itchy ears, heartburn, reflux or sour taste in the mouth

## 2021-03-19 NOTE — ADDENDUM
[FreeTextEntry1] : Documented by Ibrahima Aviles acting as a scribe for Dr. Jeremy Jackson on (03/19/2021).\par \par All medical record entries made by the Scribe were at my, Dr. Jeremy Jackson's, direction and personally dictated by me on (03/19/2021). I have reviewed the chart and agree that the record accurately reflects my personal performance of the history, physical exam, assessment and plan. I have also personally directed, reviewed, and agree with the discharge instructions.\par

## 2021-03-19 NOTE — PHYSICAL EXAM
[No Acute Distress] : no acute distress [Normal Oropharynx] : normal oropharynx [III] : Mallampati Class: III [Normal Appearance] : normal appearance [No Neck Mass] : no neck mass [Normal Rate/Rhythm] : normal rate/rhythm [Normal S1, S2] : normal s1, s2 [No Murmurs] : no murmurs [No Resp Distress] : no resp distress [Clear to Auscultation Bilaterally] : clear to auscultation bilaterally [No Abnormalities] : no abnormalities [Benign] : benign [Normal Gait] : normal gait [No Clubbing] : no clubbing [No Cyanosis] : no cyanosis [No Edema] : no edema [FROM] : FROM [Normal Color/ Pigmentation] : normal color/ pigmentation [No Focal Deficits] : no focal deficits [Oriented x3] : oriented x3 [Normal Affect] : normal affect [TextBox_68] : I:E 1:3; Clear

## 2021-03-19 NOTE — ASSESSMENT
[FreeTextEntry1] : Ms. Arellano is a 55 year old female with a history of asthma, allergy, GERD, DM, Hypothyroid, and poor sleep. She is currently stable from a pulmonary perspective and mildly symptomatic\par \par problem 1: moderate persistent asthma\par -continue Xopenex 0.63 via nebulizer TID\par -change Stiolto/Qvar to Add Breztri 2 puffs BID \par -continue Xopenex 2 puffs Q6H, pre-exercise \par -continue Zyflo CR 1200 mg BID \par -Asthma is  believed to be caused by inherited (genetic) and environmental factor, but its exact cause is unknown. Asthma may be triggered by allergens, lung infections, or irritants in the air. Asthma triggers are different for each person \par -Inhaler technique reviewed as well as oral hygiene techniques reviewed with patient. Avoidance of cold air, extremes of temperature, rescue inhaler should be used before exercise. Order of medication reviewed with patient. Recommended use of a cool mist humidifier in the bedroom. \par \par problem 2: allergies (quiet)\par -continue Claritin 10mg in the morning \par -continue OTC antihistamine PRN (Xyzal 5 mg QHS)\par -continue to use Qnasl 1 sniff each nostril BID\par -continue Olopatadine 0.6% 1 sniff/nostril BID  \par \par -Environmental measures for allergies were encouraged including mattress and pillow cover, air purifier, and environmental controls. \par \par problem 3: GERD\par -diet controlled \par -Rule of 2s: avoid eating too much, eating too late, eating too spicy, eating two hours before bed\par -Things to avoid including overeating, spicy foods, tight clothing, eating within three hours of bed, this list is not all inclusive. \par -For treatment of reflux, possible options discussed including diet control, H2 blockers, PPIs, as well as coating motility agents discussed as treatment options. Timing of meals and proximity of last meal to sleep were discussed. If symptoms persist, a formal gastrointestinal evaluation is needed. \par \par problem 4: low vitamin D\par -continue to use vitamin D therapy \par -Has been associated with asthma exacerbations and increased allergic symptoms. The goal based on recent information is maintaining levels between 50-70 and low normal is 30. Recommended 50,000 units every two weeks to once a month depending on the level. \par \par problem 5: poor sleep\par -recommended to try Sleep Guard by [employer]\par -Good sleep hygiene was encouraged including avoiding watching television an hour before bed, keeping caffeine at a low,  avoiding reading, television, or anything, in bed, no drinking any liquids three hours before bedtime, and only getting into bed when tired and ready for sleep.\par \par problem 6: health maintenance \par - S/p Covid 19 vaccine (Pfizer) \par -recommended to try optimal electrolytes\par -recommended to take CoQ10 200mg QD \par -s/p influenza vaccine 2020\par -recommended strep pneumonia vaccines: Prevnar-13 vaccine, followed by Pneumo vaccine 23 one year following\par -recommended early intervention for URIs\par -recommended regular osteoporosis evaluations\par -recommended early dermatological evaluations\par -recommended after the age of 50 to the age of 70, colonoscopy every 5 years \par \par F/U in 6 months with [facility]\par She is encouraged to call with any changes, concerns, or questions.

## 2021-03-30 ENCOUNTER — TRANSCRIPTION ENCOUNTER (OUTPATIENT)
Age: 56
End: 2021-03-30

## 2021-03-30 RX ORDER — TIOTROPIUM BROMIDE AND OLODATEROL 3.124; 2.736 UG/1; UG/1
2.5-2.5 SPRAY, METERED RESPIRATORY (INHALATION) DAILY
Qty: 3 | Refills: 1 | Status: DISCONTINUED | COMMUNITY
Start: 2020-09-24 | End: 2021-03-30

## 2021-03-31 ENCOUNTER — RX RENEWAL (OUTPATIENT)
Age: 56
End: 2021-03-31

## 2021-06-08 ENCOUNTER — RX RENEWAL (OUTPATIENT)
Age: 56
End: 2021-06-08

## 2021-06-24 ENCOUNTER — RX RENEWAL (OUTPATIENT)
Age: 56
End: 2021-06-24

## 2021-07-25 ENCOUNTER — RX RENEWAL (OUTPATIENT)
Age: 56
End: 2021-07-25

## 2021-07-26 ENCOUNTER — TRANSCRIPTION ENCOUNTER (OUTPATIENT)
Age: 56
End: 2021-07-26

## 2021-09-15 LAB — SARS-COV-2 N GENE NPH QL NAA+PROBE: NOT DETECTED

## 2021-09-17 ENCOUNTER — NON-APPOINTMENT (OUTPATIENT)
Age: 56
End: 2021-09-17

## 2021-09-17 ENCOUNTER — APPOINTMENT (OUTPATIENT)
Dept: PULMONOLOGY | Facility: CLINIC | Age: 56
End: 2021-09-17
Payer: COMMERCIAL

## 2021-09-17 VITALS
HEART RATE: 60 BPM | HEIGHT: 69 IN | SYSTOLIC BLOOD PRESSURE: 112 MMHG | RESPIRATION RATE: 17 BRPM | WEIGHT: 144 LBS | TEMPERATURE: 97 F | DIASTOLIC BLOOD PRESSURE: 76 MMHG | BODY MASS INDEX: 21.33 KG/M2 | OXYGEN SATURATION: 99 %

## 2021-09-17 PROCEDURE — 99214 OFFICE O/P EST MOD 30 MIN: CPT | Mod: 25

## 2021-09-17 PROCEDURE — 95012 NITRIC OXIDE EXP GAS DETER: CPT

## 2021-09-17 PROCEDURE — 94010 BREATHING CAPACITY TEST: CPT

## 2021-09-17 RX ORDER — ZOSTER VACCINE RECOMBINANT, ADJUVANTED 50 MCG/0.5
50 KIT INTRAMUSCULAR
Qty: 2 | Refills: 0 | Status: ACTIVE | COMMUNITY
Start: 2021-09-17 | End: 1900-01-01

## 2021-09-17 NOTE — PROCEDURE
[FreeTextEntry1] : Feno was 17; a normal value being less than 25. Fractional exhaled nitric oxide (FENO) is regarded as a simple, noninvasive method for assessing eosinophilic airway inflammation. Produced by a variety of cells within the lung, nitric oxide (NO) concentrations are generally low in healthy individuals. However, high concentrations of NO appear to be involved in nonspecific host defense mechanisms and chronic inflammatory  diseases such as asthma. The American Thoracic Society (ATS) therefore recommended using FENO to aid in the diagnosis and monitoring of eosinophilic airway inflammation and asthma, and for identifying steroid responsive individuals whose chronic respiratory symptoms may be caused by airway inflammation \par \par PFT revealed normal flows, with a FEV1 of 3.28L, which is 105% of predicted, with a normal flow volume loop\par

## 2021-09-17 NOTE — ASSESSMENT
[FreeTextEntry1] : Ms. Arellano is a 56 year old female with a history of asthma, allergy, GERD, DM, Hypothyroid, and poor sleep (improved). She is currently stable from a pulmonary perspective and stable\par \par problem 1: moderate persistent asthma\par -continue Xopenex 0.63 via nebulizer TID\par -change Stiolto/Qvar to Add Breztri 2 puffs BID \par -continue Xopenex 2 puffs Q6H, pre-exercise \par -continue Zyflo CR 1200 mg BID \par -Asthma is  believed to be caused by inherited (genetic) and environmental factor, but its exact cause is unknown. Asthma may be triggered by allergens, lung infections, or irritants in the air. Asthma triggers are different for each person \par -Inhaler technique reviewed as well as oral hygiene techniques reviewed with patient. Avoidance of cold air, extremes of temperature, rescue inhaler should be used before exercise. Order of medication reviewed with patient. Recommended use of a cool mist humidifier in the bedroom. \par \par problem 2: allergies (quiet)\par -continue Claritin 10mg in the morning \par -continue OTC antihistamine PRN (Xyzal 5 mg QHS)\par -continue to use Qnasl 1 sniff each nostril BID\par -continue Olopatadine 0.6% 1 sniff/nostril BID  \par \par -Environmental measures for allergies were encouraged including mattress and pillow cover, air purifier, and environmental controls. \par \par problem 3: GERD\par -diet controlled \par -Rule of 2s: avoid eating too much, eating too late, eating too spicy, eating two hours before bed\par -Things to avoid including overeating, spicy foods, tight clothing, eating within three hours of bed, this list is not all inclusive. \par -For treatment of reflux, possible options discussed including diet control, H2 blockers, PPIs, as well as coating motility agents discussed as treatment options. Timing of meals and proximity of last meal to sleep were discussed. If symptoms persist, a formal gastrointestinal evaluation is needed. \par \par problem 4: low vitamin D\par -continue to use vitamin D therapy \par -Has been associated with asthma exacerbations and increased allergic symptoms. The goal based on recent information is maintaining levels between 50-70 and low normal is 30. Recommended 50,000 units every two weeks to once a month depending on the level. \par \par problem 5: poor sleep - improved/resolved\par -recommended to try Sleep Guard by [employer]\par -Good sleep hygiene was encouraged including avoiding watching television an hour before bed, keeping caffeine at a low,  avoiding reading, television, or anything, in bed, no drinking any liquids three hours before bedtime, and only getting into bed when tired and ready for sleep.\par \par problem 6: health maintenance \par - S/p Covid 19 vaccine (Pfizer) - 4/2021\par -Covid 19 vaccine discussed at length with patient; booster discussed and recommended to wait for vaccine containing new delta variant \par -recommended to try optimal electrolytes\par -recommended to take CoQ10 200mg QD \par -s/p influenza vaccine 2020\par -recommended strep pneumonia vaccines: Prevnar-13 vaccine, followed by Pneumo vaccine 23 one year following\par -recommended early intervention for URIs\par -recommended regular osteoporosis evaluations\par -recommended early dermatological evaluations\par -recommended after the age of 50 to the age of 70, colonoscopy every 5 years \par \par F/U in 6 months with [facility]\par She is encouraged to call with any changes, concerns, or questions.

## 2021-09-17 NOTE — HISTORY OF PRESENT ILLNESS
[FreeTextEntry1] : Ms. Arellano is a 56 year old female presenting to the office for a follow up visit for allergic rhinitis, GERD, low vitamin D, asthma, and poor sleep. Her chief complaint is \par -she notes her energy level is good\par -she notes her blood sugar is normal\par -she notes her bowels are regular \par -she notes her sense of smell and taste is normal\par -she denies running much in the summer due to humidity but now she is starting to run more\par -she notes her sinuses are normal\par -she notes her energy level is 8/10\par -she notes her sleep has been very good recently which she attributes to the lack of hot flashes\par -she notes feeling great overall\par -no new medications, vitamins, or supplements \par - S/p Covid 19 vaccine (Pfizer) x2 4/2021\par -she denies getting the shingles vaccine yet\par \par patient denies any headaches, nausea, vomiting, fever, chills, sweats, chest pain, chest pressure, palpitations, coughing, wheezing, fatigue, diarrhea, constipation, dysphagia, myalgias, dizziness, leg swelling, leg pain, itchy eyes, itchy ears, heartburn, reflux or sour taste in the mouth

## 2021-09-17 NOTE — ADDENDUM
[FreeTextEntry1] : Documented by Willam Méndez acting as a scribe for Dr. Jeremy Jackson on (09/17/2021).\par \par All medical record entries made by the Scribe were at my, Dr. Jeremy Jackson's, direction and personally dictated by me on (09/17/2021). I have reviewed the chart and agree that the record accurately reflects my personal performance of the history, physical exam, assessment and plan. I have also personally directed, reviewed, and agree with the discharge instructions.\par

## 2021-09-25 ENCOUNTER — RX RENEWAL (OUTPATIENT)
Age: 56
End: 2021-09-25

## 2021-11-30 ENCOUNTER — TRANSCRIPTION ENCOUNTER (OUTPATIENT)
Age: 56
End: 2021-11-30

## 2021-11-30 RX ORDER — ZILEUTON 600 MG/1
600 TABLET, FILM COATED, EXTENDED RELEASE ORAL
Qty: 360 | Refills: 1 | Status: DISCONTINUED | COMMUNITY
Start: 2017-05-31 | End: 2021-11-30

## 2021-12-28 ENCOUNTER — NON-APPOINTMENT (OUTPATIENT)
Age: 56
End: 2021-12-28

## 2022-03-24 ENCOUNTER — NON-APPOINTMENT (OUTPATIENT)
Age: 57
End: 2022-03-24

## 2022-03-24 ENCOUNTER — APPOINTMENT (OUTPATIENT)
Dept: PULMONOLOGY | Facility: CLINIC | Age: 57
End: 2022-03-24
Payer: COMMERCIAL

## 2022-03-24 VITALS
HEART RATE: 64 BPM | BODY MASS INDEX: 20.62 KG/M2 | RESPIRATION RATE: 16 BRPM | DIASTOLIC BLOOD PRESSURE: 76 MMHG | SYSTOLIC BLOOD PRESSURE: 112 MMHG | HEIGHT: 70 IN | TEMPERATURE: 97.3 F | WEIGHT: 144 LBS | OXYGEN SATURATION: 99 %

## 2022-03-24 PROCEDURE — 94010 BREATHING CAPACITY TEST: CPT

## 2022-03-24 PROCEDURE — 99214 OFFICE O/P EST MOD 30 MIN: CPT | Mod: 25

## 2022-03-24 PROCEDURE — 95012 NITRIC OXIDE EXP GAS DETER: CPT

## 2022-03-24 RX ORDER — BECLOMETHASONE DIPROPIONATE HFA 80 UG/1
80 AEROSOL, METERED RESPIRATORY (INHALATION) TWICE DAILY
Qty: 3 | Refills: 1 | Status: DISCONTINUED | COMMUNITY
Start: 2020-03-24 | End: 2022-03-24

## 2022-03-24 RX ORDER — INDACATEROL MALEATE AND GLYCOPYRROLATE 27.5; 15.6 UG/1; UG/1
27.5-15.6 CAPSULE RESPIRATORY (INHALATION) TWICE DAILY
Qty: 180 | Refills: 1 | Status: DISCONTINUED | COMMUNITY
Start: 2018-09-26 | End: 2022-03-24

## 2022-03-24 RX ORDER — BECLOMETHASONE DIPROPIONATE HFA 80 UG/1
80 AEROSOL, METERED RESPIRATORY (INHALATION) TWICE DAILY
Qty: 3 | Refills: 1 | Status: DISCONTINUED | COMMUNITY
Start: 2020-01-27 | End: 2022-03-24

## 2022-03-24 NOTE — ASSESSMENT
[FreeTextEntry1] : Ms. Arellano is a 57 year old female with a history of asthma, allergy, GERD, DM, Hypothyroid, and poor sleep (improved). She is currently stable from a pulmonary perspective and stable except BS\par \par problem 1: moderate persistent asthma-stable \par -continue Xopenex 0.63 via nebulizer TID\par -continue  Breztri 2 puffs BID \par -continue Xopenex 2 puffs Q6H, pre-exercise \par -continue Zyflo CR 1200 mg BID \par -Asthma is  believed to be caused by inherited (genetic) and environmental factor, but its exact cause is unknown. Asthma may be triggered by allergens, lung infections, or irritants in the air. Asthma triggers are different for each person \par -Inhaler technique reviewed as well as oral hygiene techniques reviewed with patient. Avoidance of cold air, extremes of temperature, rescue inhaler should be used before exercise. Order of medication reviewed with patient. Recommended use of a cool mist humidifier in the bedroom. \par \par problem 2: allergies (quiet)\par -continue Claritin 10mg in the morning \par -continue OTC antihistamine PRN (Xyzal 5 mg QHS)\par -continue to use Qnasl 1 sniff each nostril BID\par -continue Olopatadine 0.6% 1 sniff/nostril BID  \par \par -Environmental measures for allergies were encouraged including mattress and pillow cover, air purifier, and environmental controls. \par \par problem 3: GERD\par -diet controlled \par -Rule of 2s: avoid eating too much, eating too late, eating too spicy, eating two hours before bed\par -Things to avoid including overeating, spicy foods, tight clothing, eating within three hours of bed, this list is not all inclusive. \par -For treatment of reflux, possible options discussed including diet control, H2 blockers, PPIs, as well as coating motility agents discussed as treatment options. Timing of meals and proximity of last meal to sleep were discussed. If symptoms persist, a formal gastrointestinal evaluation is needed. \par \par problem 4: low vitamin D\par -continue to use vitamin D therapy \par -Has been associated with asthma exacerbations and increased allergic symptoms. The goal based on recent information is maintaining levels between 50-70 and low normal is 30. Recommended 50,000 units every two weeks to once a month depending on the level. \par \par problem 5: poor sleep - improved/resolved\par -recommended to try Sleep Guard by [employer]\par -Good sleep hygiene was encouraged including avoiding watching television an hour before bed, keeping caffeine at a low,  avoiding reading, television, or anything, in bed, no drinking any liquids three hours before bedtime, and only getting into bed when tired and ready for sleep.\par \par problem 6: health maintenance \par -recommended Dr Jeremy Castañeda (Obesity Code) \par - S/p Covid 19 vaccine (Pfizer) x3 \par -Covid 19 vaccine discussed at length with patient; booster discussed and recommended to wait for vaccine containing new delta variant \par -recommended to try optimal electrolytes\par -recommended to take CoQ10 200mg QD \par -s/p influenza vaccine 2020\par -recommended strep pneumonia vaccines: Prevnar-13 vaccine, followed by Pneumo vaccine 23 one year following\par -recommended early intervention for URIs\par -recommended regular osteoporosis evaluations\par -recommended early dermatological evaluations\par -recommended after the age of 50 to the age of 70, colonoscopy every 5 years \par \par F/U in 6 months with [facility]\par She is encouraged to call with any changes, concerns, or questions.

## 2022-03-24 NOTE — HISTORY OF PRESENT ILLNESS
[FreeTextEntry1] : Ms. Arellano is a 57 year old female presenting to the office for a follow up visit for allergic rhinitis, GERD, low vitamin D, asthma, and poor sleep. Her chief complaint is \par \par -she notes energy level is good \par -she notes working with nutritionist to control glucose levels \par -she notes constant fluctuation of glucose levels which has caused fluctuations in condition\par -she notes running, swimming, and biking  for exercise with no limitations  \par -She notes that bowels are regular \par -she denies arthralgia and myalgia \par -she notes tolerating breztri and olopatadine well \par -she notes sleep is good \par \par -denies any visual issues, headaches, nausea, vomiting, fever, chills, sweats, chest pain, chest pressure, diarrhea, constipation, dysphagia, dizziness, leg swelling, leg pain, itchy eyes, itchy ears, heartburn, reflux, or sour taste in the mouth.

## 2022-03-24 NOTE — PROCEDURE
[FreeTextEntry1] : PFT revealed normal flows, with a FEV1 of 3.22L,which is 100% of predicted with a normal flow volume loop \par \par FENO was 32 ; a normal value being less than 25\par Fractional exhaled nitric oxide (FENO) is regarded as a simple, noninvasive method for assessing eosinophilic airway inflammation. Produced by a variety of cells within the lung, nitric oxide (NO) concentrations are generally low in healthy individuals. However, high concentrations of NO appear to be involved in nonspecific host defense mechanisms and chronic inflammatory diseases such as asthma. The American Thoracic Society (ATS) therefore has recommended using FENO to aid in the diagnosis and monitoring of eosinophilic airway inflammation and asthma, and for identifying steroid responsive individuals whose chronic respiratory symptoms may be caused by airway inflammation.

## 2022-03-24 NOTE — ADDENDUM
[FreeTextEntry1] : Documented by Tien Zamudio acting as a scribe for Dr. Jeremy Jackson on 03/24/2022 \par \par All medical record entries made by the Scribe were at my, Dr. Jeremy Jackson's, direction and personally dictated by me on 03/24/2022 . I have reviewed the chart and agree that the record accurately reflects my personal performance of the history, physical exam, assessment and plan. I have also personally directed, reviewed, and agree with the discharge instructions

## 2022-05-09 ENCOUNTER — RX RENEWAL (OUTPATIENT)
Age: 57
End: 2022-05-09

## 2022-05-09 ENCOUNTER — TRANSCRIPTION ENCOUNTER (OUTPATIENT)
Age: 57
End: 2022-05-09

## 2022-08-11 ENCOUNTER — TRANSCRIPTION ENCOUNTER (OUTPATIENT)
Age: 57
End: 2022-08-11

## 2022-09-22 ENCOUNTER — NON-APPOINTMENT (OUTPATIENT)
Age: 57
End: 2022-09-22

## 2022-09-22 ENCOUNTER — APPOINTMENT (OUTPATIENT)
Dept: PULMONOLOGY | Facility: CLINIC | Age: 57
End: 2022-09-22

## 2022-09-22 VITALS
HEART RATE: 65 BPM | BODY MASS INDEX: 20.76 KG/M2 | DIASTOLIC BLOOD PRESSURE: 70 MMHG | WEIGHT: 145 LBS | OXYGEN SATURATION: 98 % | HEIGHT: 70 IN | TEMPERATURE: 97 F | SYSTOLIC BLOOD PRESSURE: 120 MMHG | RESPIRATION RATE: 17 BRPM

## 2022-09-22 PROCEDURE — 95012 NITRIC OXIDE EXP GAS DETER: CPT

## 2022-09-22 PROCEDURE — 99214 OFFICE O/P EST MOD 30 MIN: CPT | Mod: 25

## 2022-09-22 PROCEDURE — 94010 BREATHING CAPACITY TEST: CPT

## 2022-09-22 NOTE — HISTORY OF PRESENT ILLNESS
[FreeTextEntry1] : Ms. Arellano is a 57 year old female presenting to the office for a follow up visit for allergic rhinitis, GERD, low vitamin D, asthma, and poor sleep. Her chief complaint is \par \par -she notes feeling generally well\par -she notes running a marathon recently\par -she notes being more fatigue this summer than in the past which she attributed to heat waves but has since returned\par -she notes that her diet has not changed at all\par -she notes that her exercise routine remains consistent\par -she notes orthopedic pains when running on concrete\par -she notes her weight is stable\par -she notes that she is sleeping well  (better)\par -s/p covid 19 vaccine x3 \par \par -patient denies any headaches, nausea, vomiting, fever, chills, sweats, chest pain, chest pressure, palpitations, coughing, wheezing, diarrhea, constipation, dysphagia, myalgias, dizziness, leg swelling, leg pain, itchy eyes, itchy ears, heartburn, reflux or sour taste in the mouth

## 2022-09-22 NOTE — ADDENDUM
[FreeTextEntry1] : Documented by Stan Méndez acting as a scribe for Dr. Jeremy Jackson on 09/22/2022.\par \par All medical record entries made by the Scribe were at my, Dr. Jeremy Jackson's, direction and personally dictated by me on 09/22/2022. I have reviewed the chart and agree that the record accurately reflects my personal performance of the history, physical exam, assessment and plan. I have also personally directed, reviewed, and agree with the discharge instructions.

## 2022-09-22 NOTE — PROCEDURE
[FreeTextEntry1] : PFT revealed normal flows, with a FEV1 of 3.26L, which is 102% of predicted, with a normal flow volume loop \par \par Feno was 11; a normal value being less than 25. Fractional exhaled nitric oxide (FENO) is regarded as a simple, noninvasive method for assessing eosinophilic airway inflammation. Produced by a variety of cells within the lung, nitric oxide (NO) concentrations are generally low in healthy individuals. However, high concentrations of NO appear to be involved in nonspecific host defense mechanisms and chronic inflammatory  diseases such as asthma. The American Thoracic Society (ATS) therefore recommended using FENO to aid in the diagnosis and monitoring of eosinophilic airway inflammation and asthma, and for identifying steroid responsive individuals whose chronic respiratory symptoms may be caused by airway inflammation

## 2022-10-01 ENCOUNTER — EMERGENCY (EMERGENCY)
Facility: HOSPITAL | Age: 57
LOS: 0 days | Discharge: HOME | End: 2022-10-02
Attending: EMERGENCY MEDICINE
Payer: COMMERCIAL

## 2022-10-01 VITALS
RESPIRATION RATE: 18 BRPM | DIASTOLIC BLOOD PRESSURE: 67 MMHG | HEART RATE: 55 BPM | WEIGHT: 115.08 LBS | SYSTOLIC BLOOD PRESSURE: 105 MMHG | OXYGEN SATURATION: 100 % | TEMPERATURE: 98 F

## 2022-10-01 DIAGNOSIS — E10.649 TYPE 1 DIABETES MELLITUS WITH HYPOGLYCEMIA WITHOUT COMA: ICD-10-CM

## 2022-10-01 DIAGNOSIS — J45.909 UNSPECIFIED ASTHMA, UNCOMPLICATED: ICD-10-CM

## 2022-10-01 DIAGNOSIS — Z79.4 LONG TERM (CURRENT) USE OF INSULIN: ICD-10-CM

## 2022-10-01 DIAGNOSIS — R55 SYNCOPE AND COLLAPSE: ICD-10-CM

## 2022-10-01 DIAGNOSIS — Z88.1 ALLERGY STATUS TO OTHER ANTIBIOTIC AGENTS STATUS: ICD-10-CM

## 2022-10-01 PROCEDURE — 80053 COMPREHEN METABOLIC PANEL: CPT

## 2022-10-01 PROCEDURE — 85025 COMPLETE CBC W/AUTO DIFF WBC: CPT

## 2022-10-01 PROCEDURE — 84100 ASSAY OF PHOSPHORUS: CPT

## 2022-10-01 PROCEDURE — 82962 GLUCOSE BLOOD TEST: CPT

## 2022-10-01 PROCEDURE — 36415 COLL VENOUS BLD VENIPUNCTURE: CPT

## 2022-10-01 PROCEDURE — 81001 URINALYSIS AUTO W/SCOPE: CPT

## 2022-10-01 PROCEDURE — 83735 ASSAY OF MAGNESIUM: CPT

## 2022-10-01 PROCEDURE — 99283 EMERGENCY DEPT VISIT LOW MDM: CPT

## 2022-10-01 PROCEDURE — 99284 EMERGENCY DEPT VISIT MOD MDM: CPT

## 2022-10-01 RX ORDER — SODIUM CHLORIDE 9 MG/ML
1000 INJECTION, SOLUTION INTRAVENOUS
Refills: 0 | Status: DISCONTINUED | OUTPATIENT
Start: 2022-10-01 | End: 2022-10-02

## 2022-10-01 NOTE — ED PROVIDER NOTE - CLINICAL SUMMARY MEDICAL DECISION MAKING FREE TEXT BOX
pt with hypoglycemic event, has insulin pump, ate dinner, pspoke withpt endocrinologist, recommend lowering basal rate, having pt eat and if bs rises dc

## 2022-10-01 NOTE — ED PROVIDER NOTE - PROGRESS NOTE DETAILS
speaking to on call endocrinologist Dr. Bartlett    , nothing in her chart notes that she is non compliant, last appt was 9/14/22 less than 1% episodes were low.  recommends decreasing basal rate at 2pm to 0.15 SRIKANTH Vital DO bgm over 200, will d/c to home with , pt encourage to fu in office on monday B Amanuel HUFF

## 2022-10-01 NOTE — ED PROVIDER NOTE - PHYSICAL EXAMINATION
Gen:  Well appearing in NAD  Head:  NC/AT  HEENT: pupils perrl,no pharyngeal erythema, uvula midline  Cardiac: S1S2, RRR  Abd: Soft, non tender  Resp: No distress, CTA   musculoskeletal:: no deformities, no swelling, strength +5/+5  Skin: warm and dry as visualized, no rashes  Neuro: BARTLETT, aao x 4  Psych:alert, cooperative, appropriate mood and affect for situation

## 2022-10-01 NOTE — ED PROVIDER NOTE - PATIENT PORTAL LINK FT
You can access the FollowMyHealth Patient Portal offered by [facility] by registering at the following website: [email or web address]. By joining [employer]’s FollowMyHealth portal, you will also be able to view your health information using other applications (apps) compatible with our system.

## 2022-10-01 NOTE — ED ADULT NURSE NOTE - CINV DISCH TEACH PARTICIP
History of Present Illness:  Patient is a 69 year old female here today for   Chief Complaint   Patient presents with   • Follow-up     2 week follow up for low HR    • Hypertension     HOLDING atenolol-chlorothalidone 50-25 mg per tablet   • Anticoagulation     HX of DVT warfarin 5mg tablets Take 2.5 mg ( 1/2 tab) on Sun, Tues and Thurs and 5 mg (1 tab) the other days     Past Medical History:   Diagnosis Date   • Arthritis    • Blood clot associated with vein wall inflammation     DVTs   • Clotting disorder (CMS/HCC)    • Coronary artery disease 2018    atrial fib   • DVT (deep venous thrombosis) (CMS/[facility])    • HTN (hypertension)        I have reviewed the patient's medications and allergies, past medical, surgical, social and family history with patient, updating these as appropriate.  See Histories section of the EMR for a display of this information.    Review of Systems:  All other ROS negative except as documented in the HPI.    Visit Vitals  /78 (BP [place]: [facility] [place] Patient Position: Sitting, Cuff Size: Large Adult)   Pulse 60   Wt 104.3 kg   BMI 37.12 kg/m²       Physical Exam:  Alert oriented carries on normal conversation gives good history heart rate is controlled at this time    Assessment and Plan:  1.  Hypertension we'll hold on her atenolol chlorthalidone at this time recheck blood pressure in the next one month  2.  History DVT he remains on Coumadin  3.  History of atrial flutter stable at that this time we'll continue to monitor heart rate      No notes on file  No orders of the defined types were placed in this encounter.     Patient

## 2022-10-01 NOTE — ED ADULT TRIAGE NOTE - CHIEF COMPLAINT QUOTE
Per EMS, called for hypoglycemic episode. BGM was 40, family was giving patient Gatorade, repeat BGM 60. At triage, patient awake alert and oriented, answering all questions appropriately. VS stable. BGM 57.

## 2022-10-01 NOTE — ED PROVIDER NOTE - NSFOLLOWUPINSTRUCTIONS_ED_ALL_ED_FT
please avoid alcohol  as recommended by endocrinologist lower 2 pm basal dose   eat regular meals  follow up withur endocrinologist monday, Dr. Nova is in [facility], call Dr. Bartlett.      Hypoglycemia in a Person with Diabetes    WHAT YOU NEED TO KNOW:    Hypoglycemia is a serious condition that happens when your blood glucose (sugar) level drops too low. The blood sugar level is usually too high in a person with diabetes, but the level can also drop too low. It is important to follow your diabetes management plan to keep your blood sugar level steady.    DISCHARGE INSTRUCTIONS:    Have someone call your local emergency number (911 in the ) if:   •You have a seizure or pass out.      •Your blood sugar is less than 50 mg/dL and does not respond to treatment.      •You feel you are going to pass out.      •You have trouble thinking clearly.      Call your doctor or diabetes care team provider if:   •You have had symptoms of low blood sugar several times.      •You have questions about the amount of insulin or diabetes medicine you are taking.      •You have questions or concerns about your condition or care.      Medicines:   •Insulin or diabetes medicine help to keep your blood sugar under control.      •Glucagon may be needed if you have severe hypoglycemia.      •Take your medicine as directed. Contact your healthcare provider if you think your medicine is not helping or if you have side effects. Tell your provider if you are allergic to any medicine. Keep a list of the medicines, vitamins, and herbs you take. Include the amounts, and when and why you take them. Bring the list or the pill bottles to follow-up visits. Carry your medicine list with you in case of an emergency.      Manage hypoglycemia:   •Check your blood sugar level right away if you have symptoms of hypoglycemia. Hypoglycemia usually happens when your blood sugar level is 70 mg/dL or below. Ask your diabetes care team provider what blood sugar level is too low for you.      •If your blood sugar level is too low, eat or drink 15 grams of fast-acting carbohydrate. Examples of this amount of fast-acting carbohydrate are 4 ounces (½ cup) of fruit juice or 4 ounces of regular soda. Other examples are 2 tablespoons of raisins or 1 tube of glucose gel.  Ways to Raise Your Blood Sugar     Check your blood sugar level 15 minutes later. Sit still as you wait. If the level is still low (less than 100 mg/dL), have another 15 grams of carbohydrate. When the level returns to 100 mg/dL, eat a meal if it is time. If your meal time is more than 1 hour away, eat a snack. The snack should contain carbohydrates, such as the following:?3/4 cup of cereal      ?1 cup of skim or low fat milk      ?6 soda crackers      ?1/2 of a turkey sandwich      ?15 fat-free chips  This will help prevent another drop in blood sugar. Always carefully follow your provider's instructions on how to treat low blood sugar levels.    •Always carry a source of fast-acting carbohydrate. If you have symptoms of hypoglycemia and you do not have a blood glucose meter, have a source of fast-acting carbohydrate anyway. Avoid carbohydrate foods that are high in fat. The fat content may make the carbohydrate take longer to increase your blood sugar level. Ask your provider if you should carry a glucagon kit. Glucagon is a medicine that is injected when you develop severe hypoglycemia and become unconscious. Check the expiration date every month and replace it before it expires.      •Teach others how to help you if you have symptoms of hypoglycemia. Tell them about the symptoms of hypoglycemia. Ask them to give you a source of fast-acting carbohydrate if you cannot get it yourself. Ask them to give you a glucagon injection if you have signs of hypoglycemia and you become unconscious or have a seizure. Ask them to call the local emergency number (911 in the ). This is an emergency. Tell them never to try to make you swallow anything if you faint or have a seizure.      •Wear medical alert jewelry or carry a card that says you have diabetes. Ask where to get these items.  Medical Alert Jewelry           Prevent hypoglycemia:   •Take diabetes medicine as directed. Take your medicine at the right time and in the right amount. Do not double the amount of medicine you take unless instructed by your diabetes care team provider. You may need oral diabetes medicine, insulin, or both to help control your blood sugar levels. Your healthcare provider will teach you how and when to take oral diabetes medicine. You will also be taught about side effects oral diabetes medicine can cause. Insulin may be added if oral diabetes medicine becomes less effective over time. Insulin may be injected, or given through a pump or pen. You and your care team will discuss which method is best for you.?An insulin pump is an implanted device that gives your insulin 24 hours a day. An insulin pump prevents the need for multiple insulin injections in a day.             ?An insulin pen is a device prefilled with the right amount of insulin.  Insulin Pen            •Eat meals and snacks as directed. Talk to your dietitian or provider about a meal plan that is right for you. Do not skip meals.  Plate Method           •Check your blood sugar level as directed. Ask your provider what your blood sugar levels should be before and after you eat. Ask when and how often to check your blood sugar level. You may need to check a drop of blood in a glucose test machine. You may need to check at least 3 times each day. Record your blood sugar level results and take the record with you when you see your care team. They may use it to make changes to your medicine, food, or exercise schedules. Your care team provider may recommend a continuous glucose monitor (CGM). A CGM is a device that is worn at all times. The CGM checks your blood sugar every 5 minutes. It sends results to an electronic device such as a smart phone.  How to check your blood sugar       Continuous Glucose Monitoring            •Check your blood sugar level before you exercise. Physical activity, such as exercise, can decrease your blood sugar level. If your blood sugar level is less than 100 mg/dL, have a carbohydrate snack. Examples are 4 to 6 crackers, ½ banana, 8 ounces (1 cup) of nonfat or 1% milk, or 4 ounces (½ cup) of juice. If you will be active for more than 1 hour, you may need to check your blood sugar level every 30 minutes. Your provider may also recommend that you check your blood sugar level after your activity.      •Know the risks if you choose to drink alcohol. Alcohol can cause your blood sugar levels to be low if you use insulin. Alcohol can cause high blood sugar levels and weight gain if you drink too much. Women 21 years or older and men 65 years or older should limit alcohol to 1 drink a day. Men aged 21 to 64 years should limit alcohol to 2 drinks a day. A drink of alcohol is 12 ounces of beer, 5 ounces of wine, or 1½ ounces of liquor.      Follow up with your doctor or diabetes care team provider as directed: Write down your questions so you remember to ask them during your visits.       © Copyright [employer] 2022           back to top                          © Copyright [employer] 2022

## 2022-10-01 NOTE — ED PROVIDER NOTE - OBJECTIVE STATEMENT
58 yo female biba from home s/p hypoglycemic episode, bgm was 40 they gave gatorade and tnow it is 60. She is an insulin dependent diabetic, has asthma and is a marathon rummer. 56 yo female biba from home s/p hypoglycemic episode, bgm was 40 they gave gatorade and tnow it is 60. She is an insulin dependent diabetic on a pump and has a dex com. she is a little biligerent in not wanting to turn her pump off, take it off stating "it is not an exacet science"  family member at bedside. pt is aao x 3, has asthma and is a marathon rummer. Pt dexcom  recorded low at approx 830pm, she states she did not give herself a bolus and she does not know what happened.

## 2022-10-01 NOTE — ED ADULT NURSE NOTE - OBJECTIVE STATEMENT
Pt c/o hypoglycemic episode. BGM 40, fmaily gave gatorade, repeat bgm of 60, MES BGM of 57. Pt A&ox4, Answering appropriately.  Per personal omni reading upon assessment, BGM of 80. VS stable.

## 2022-10-02 VITALS
SYSTOLIC BLOOD PRESSURE: 110 MMHG | OXYGEN SATURATION: 100 % | HEART RATE: 60 BPM | DIASTOLIC BLOOD PRESSURE: 65 MMHG | TEMPERATURE: 98 F | RESPIRATION RATE: 18 BRPM

## 2022-10-02 LAB
ADD ON TEST-SPECIMEN IN LAB: SIGNIFICANT CHANGE UP
ALBUMIN SERPL ELPH-MCNC: 3.4 G/DL — SIGNIFICANT CHANGE UP (ref 3.3–5)
ALP SERPL-CCNC: 56 U/L — SIGNIFICANT CHANGE UP (ref 40–120)
ALT FLD-CCNC: 30 U/L — SIGNIFICANT CHANGE UP (ref 12–78)
ANION GAP SERPL CALC-SCNC: 8 MMOL/L — SIGNIFICANT CHANGE UP (ref 5–17)
APPEARANCE UR: CLEAR — SIGNIFICANT CHANGE UP
AST SERPL-CCNC: 24 U/L — SIGNIFICANT CHANGE UP (ref 15–37)
BASOPHILS # BLD AUTO: 0.03 K/UL — SIGNIFICANT CHANGE UP (ref 0–0.2)
BASOPHILS NFR BLD AUTO: 0.3 % — SIGNIFICANT CHANGE UP (ref 0–2)
BILIRUB SERPL-MCNC: 0.2 MG/DL — SIGNIFICANT CHANGE UP (ref 0.2–1.2)
BILIRUB UR-MCNC: NEGATIVE — SIGNIFICANT CHANGE UP
BUN SERPL-MCNC: 21 MG/DL — SIGNIFICANT CHANGE UP (ref 7–23)
CALCIUM SERPL-MCNC: 8.7 MG/DL — SIGNIFICANT CHANGE UP (ref 8.5–10.1)
CHLORIDE SERPL-SCNC: 103 MMOL/L — SIGNIFICANT CHANGE UP (ref 96–108)
CO2 SERPL-SCNC: 27 MMOL/L — SIGNIFICANT CHANGE UP (ref 22–31)
COLOR SPEC: YELLOW — SIGNIFICANT CHANGE UP
CREAT SERPL-MCNC: 1.05 MG/DL — SIGNIFICANT CHANGE UP (ref 0.5–1.3)
DIFF PNL FLD: NEGATIVE — SIGNIFICANT CHANGE UP
EGFR: 62 ML/MIN/1.73M2 — SIGNIFICANT CHANGE UP
EOSINOPHIL # BLD AUTO: 0.09 K/UL — SIGNIFICANT CHANGE UP (ref 0–0.5)
EOSINOPHIL NFR BLD AUTO: 0.8 % — SIGNIFICANT CHANGE UP (ref 0–6)
GLUCOSE SERPL-MCNC: 94 MG/DL — SIGNIFICANT CHANGE UP (ref 70–99)
GLUCOSE UR QL: NEGATIVE — SIGNIFICANT CHANGE UP
HCT VFR BLD CALC: 38 % — SIGNIFICANT CHANGE UP (ref 34.5–45)
HGB BLD-MCNC: 12.7 G/DL — SIGNIFICANT CHANGE UP (ref 11.5–15.5)
IMM GRANULOCYTES NFR BLD AUTO: 0.4 % — SIGNIFICANT CHANGE UP (ref 0–0.9)
KETONES UR-MCNC: NEGATIVE — SIGNIFICANT CHANGE UP
LEUKOCYTE ESTERASE UR-ACNC: ABNORMAL
LYMPHOCYTES # BLD AUTO: 1.95 K/UL — SIGNIFICANT CHANGE UP (ref 1–3.3)
LYMPHOCYTES # BLD AUTO: 17.1 % — SIGNIFICANT CHANGE UP (ref 13–44)
MCHC RBC-ENTMCNC: 31.7 PG — SIGNIFICANT CHANGE UP (ref 27–34)
MCHC RBC-ENTMCNC: 33.4 GM/DL — SIGNIFICANT CHANGE UP (ref 32–36)
MCV RBC AUTO: 94.8 FL — SIGNIFICANT CHANGE UP (ref 80–100)
MONOCYTES # BLD AUTO: 0.64 K/UL — SIGNIFICANT CHANGE UP (ref 0–0.9)
MONOCYTES NFR BLD AUTO: 5.6 % — SIGNIFICANT CHANGE UP (ref 2–14)
NEUTROPHILS # BLD AUTO: 8.63 K/UL — HIGH (ref 1.8–7.4)
NEUTROPHILS NFR BLD AUTO: 75.8 % — SIGNIFICANT CHANGE UP (ref 43–77)
NITRITE UR-MCNC: NEGATIVE — SIGNIFICANT CHANGE UP
PH UR: 5 — SIGNIFICANT CHANGE UP (ref 5–8)
PLATELET # BLD AUTO: 283 K/UL — SIGNIFICANT CHANGE UP (ref 150–400)
POTASSIUM SERPL-MCNC: 3.7 MMOL/L — SIGNIFICANT CHANGE UP (ref 3.5–5.3)
POTASSIUM SERPL-SCNC: 3.7 MMOL/L — SIGNIFICANT CHANGE UP (ref 3.5–5.3)
PROT SERPL-MCNC: 6.8 GM/DL — SIGNIFICANT CHANGE UP (ref 6–8.3)
PROT UR-MCNC: NEGATIVE — SIGNIFICANT CHANGE UP
RBC # BLD: 4.01 M/UL — SIGNIFICANT CHANGE UP (ref 3.8–5.2)
RBC # FLD: 12 % — SIGNIFICANT CHANGE UP (ref 10.3–14.5)
SODIUM SERPL-SCNC: 138 MMOL/L — SIGNIFICANT CHANGE UP (ref 135–145)
SP GR SPEC: 1.01 — SIGNIFICANT CHANGE UP (ref 1.01–1.02)
UROBILINOGEN FLD QL: NEGATIVE — SIGNIFICANT CHANGE UP
WBC # BLD: 11.38 K/UL — HIGH (ref 3.8–10.5)
WBC # FLD AUTO: 11.38 K/UL — HIGH (ref 3.8–10.5)

## 2022-10-02 RX ADMIN — SODIUM CHLORIDE 125 MILLILITER(S): 9 INJECTION, SOLUTION INTRAVENOUS at 00:09

## 2022-11-05 ENCOUNTER — RX RENEWAL (OUTPATIENT)
Age: 57
End: 2022-11-05

## 2023-03-23 ENCOUNTER — APPOINTMENT (OUTPATIENT)
Dept: PULMONOLOGY | Facility: CLINIC | Age: 58
End: 2023-03-23
Payer: COMMERCIAL

## 2023-03-23 VITALS
OXYGEN SATURATION: 98 % | HEIGHT: 70 IN | RESPIRATION RATE: 16 BRPM | BODY MASS INDEX: 20.76 KG/M2 | WEIGHT: 145 LBS | HEART RATE: 70 BPM | DIASTOLIC BLOOD PRESSURE: 74 MMHG | SYSTOLIC BLOOD PRESSURE: 120 MMHG | TEMPERATURE: 97.4 F

## 2023-03-23 DIAGNOSIS — R05.9 COUGH, UNSPECIFIED: ICD-10-CM

## 2023-03-23 DIAGNOSIS — U07.1 COVID-19: ICD-10-CM

## 2023-03-23 PROBLEM — E10.9 TYPE 1 DIABETES MELLITUS WITHOUT COMPLICATIONS: Chronic | Status: ACTIVE | Noted: 2022-10-01

## 2023-03-23 PROBLEM — Z96.41 PRESENCE OF INSULIN PUMP (EXTERNAL) (INTERNAL): Chronic | Status: ACTIVE | Noted: 2022-10-01

## 2023-03-23 PROBLEM — J45.909 UNSPECIFIED ASTHMA, UNCOMPLICATED: Chronic | Status: ACTIVE | Noted: 2022-10-01

## 2023-03-23 PROCEDURE — 94010 BREATHING CAPACITY TEST: CPT

## 2023-03-23 PROCEDURE — 94729 DIFFUSING CAPACITY: CPT

## 2023-03-23 PROCEDURE — 99214 OFFICE O/P EST MOD 30 MIN: CPT | Mod: 25

## 2023-03-23 PROCEDURE — 94727 GAS DIL/WSHOT DETER LNG VOL: CPT

## 2023-03-23 PROCEDURE — 95012 NITRIC OXIDE EXP GAS DETER: CPT

## 2023-03-23 NOTE — PROCEDURE
[FreeTextEntry1] : Feno was 18; a normal value being less than 25. Fractional exhaled nitric oxide (FENO) is regarded as a simple, noninvasive method for assessing eosinophilic airway inflammation. Produced by a variety of cells within the lung, nitric oxide (NO) concentrations are generally low in healthy individuals. However, high concentrations of NO appear to be involved in nonspecific host defense mechanisms and chronic inflammatory  diseases such as asthma. The American Thoracic Society (ATS) therefore recommended using FENO to aid in the diagnosis and monitoring of eosinophilic airway inflammation and asthma, and for identifying steroid responsive individuals whose chronic respiratory symptoms may be caused by airway inflammation \par \par Full PFT revealed normal flows, with a FEV1 of 3.75L, which is 126% of predicted, normal lung volumes, and a diffusion of 23.8, which is 110% of predicted, with a normal flow volume loop

## 2023-03-23 NOTE — ASSESSMENT
[FreeTextEntry1] : Ms. Arellano is a 58 year old female with a history of asthma, allergy, GERD, DM, Hypothyroid, and poor sleep (improved). She is currently stable from a pulmonary perspective and stable except poor sleep\par \par problem 1: moderate persistent asthma-stable \par -continue Xopenex 0.63 via nebulizer TID\par -continue  Breztri 2 puffs BID \par -continue Xopenex 2 puffs Q6H, pre-exercise \par -continue Zyflo CR 1200 mg BID \par -Asthma is  believed to be caused by inherited (genetic) and environmental factor, but its exact cause is unknown. Asthma may be triggered by allergens, lung infections, or irritants in the air. Asthma triggers are different for each person \par -Inhaler technique reviewed as well as oral hygiene techniques reviewed with patient. Avoidance of cold air, extremes of temperature, rescue inhaler should be used before exercise. Order of medication reviewed with patient. Recommended use of a cool mist humidifier in the bedroom. \par \par problem 2: allergies (quiet)\par -continue Claritin 10mg in the morning \par -continue OTC antihistamine PRN (Xyzal 5 mg QHS)\par -continue to use Qnasl 1 sniff each nostril BID\par -continue Olopatadine 0.6% 1 sniff/nostril BID  \par \par -Environmental measures for allergies were encouraged including mattress and pillow cover, air purifier, and environmental controls. \par \par problem 3: GERD\par -diet controlled \par -Rule of 2s: avoid eating too much, eating too late, eating too spicy, eating two hours before bed\par -Things to avoid including overeating, spicy foods, tight clothing, eating within three hours of bed, this list is not all inclusive. \par -For treatment of reflux, possible options discussed including diet control, H2 blockers, PPIs, as well as coating motility agents discussed as treatment options. Timing of meals and proximity of last meal to sleep were discussed. If symptoms persist, a formal gastrointestinal evaluation is needed. \par \par problem 4: low vitamin D\par -continue to use vitamin D therapy \par -Has been associated with asthma exacerbations and increased allergic symptoms. The goal based on recent information is maintaining levels between 50-70 and low normal is 30. Recommended 50,000 units every two weeks to once a month depending on the level. \par \par problem 5: poor sleep - improved/resolved\par -recommended Epsom Salt baths before bed for relaxation\par -recommended to try Sleep Guard by Amaris\par -Good sleep hygiene was encouraged including avoiding watching television an hour before bed, keeping caffeine at a low,  avoiding reading, television, or anything, in bed, no drinking any liquids three hours before bedtime, and only getting into bed when tired and ready for sleep.\par \par problem 6: health maintenance \par -recommended Sanotize anti viral nasal spray in case of viral infection \par -recommended Dr Jeremy Castañeda (Obesity Code) \par - S/p Covid 19 vaccine (Pfizer) x3 \par -Covid 19 vaccine discussed at length with patient; booster discussed and recommended\par -recommended to try optimal electrolytes\par -recommended to take CoQ10 200mg QD \par -s/p influenza vaccine 2020\par -recommended strep pneumonia vaccines: Prevnar-13 vaccine, followed by Pneumo vaccine 23 one year following\par -recommended early intervention for URIs\par -recommended regular osteoporosis evaluations\par -recommended early dermatological evaluations\par -recommended after the age of 50 to the age of 70, colonoscopy every 5 years \par \par F/U in 6 months with CAROLINA DUNCANOX\par She is encouraged to call with any changes, concerns, or questions.

## 2023-03-23 NOTE — HISTORY OF PRESENT ILLNESS
[FreeTextEntry1] : Ms. Arellano is a 58 year old female presenting to the office for a follow up visit for allergic rhinitis, GERD, low vitamin D, asthma, and poor sleep. Her chief complaint is \par \par -she notes feeling generally well \par -she notes exercising  (stretching, core exercises, running)\par -she denies any visual changes\par -she denies hoarseness \par -she notes not getting enough sleep \par -s/p COVID-19 infection 11/2022 \par -she notes her sleep is interrupted by her 's snoring \par -she denies snoring \par \par \par -patient denies any headaches, nausea, vomiting, fever, chills, sweats, chest pain, chest pressure, palpitations, coughing, wheezing, fatigue, diarrhea, constipation, dysphagia, myalgias, dizziness, leg swelling, leg pain, itchy eyes, itchy ears, heartburn, reflux or sour taste in the mouth

## 2023-03-23 NOTE — PHYSICAL EXAM
[No Acute Distress] : no acute distress [Normal Oropharynx] : normal oropharynx [II] : Mallampati Class: II [Normal Appearance] : normal appearance [No Neck Mass] : no neck mass [Normal Rate/Rhythm] : normal rate/rhythm [No Murmurs] : no murmurs [Normal S1, S2] : normal s1, s2 [No Resp Distress] : no resp distress [Clear to Auscultation Bilaterally] : clear to auscultation bilaterally [No Abnormalities] : no abnormalities [Benign] : benign [Normal Gait] : normal gait [No Clubbing] : no clubbing [No Cyanosis] : no cyanosis [No Edema] : no edema [FROM] : FROM [Normal Color/ Pigmentation] : normal color/ pigmentation [No Focal Deficits] : no focal deficits [Oriented x3] : oriented x3 [Normal Affect] : normal affect [TextBox_68] : I:E 1:3; Clear

## 2023-03-23 NOTE — ADDENDUM
[FreeTextEntry1] : Documented by Stan Méndez acting as a scribe for Dr. Jeremy Jackson on 03/23/2023.\par \par All medical record entries made by the Scribe were at my, Dr. Jeremy Jackson's, direction and personally dictated by me on 03/23/2023. I have reviewed the chart and agree that the record accurately reflects my personal performance of the history, physical exam, assessment and plan. I have also personally directed, reviewed, and agree with the discharge instructions.

## 2023-04-29 ENCOUNTER — RX RENEWAL (OUTPATIENT)
Age: 58
End: 2023-04-29

## 2023-05-13 ENCOUNTER — RX RENEWAL (OUTPATIENT)
Age: 58
End: 2023-05-13

## 2023-09-19 ENCOUNTER — NON-APPOINTMENT (OUTPATIENT)
Age: 58
End: 2023-09-19

## 2023-09-21 ENCOUNTER — APPOINTMENT (OUTPATIENT)
Dept: PULMONOLOGY | Facility: CLINIC | Age: 58
End: 2023-09-21
Payer: COMMERCIAL

## 2023-09-21 VITALS
OXYGEN SATURATION: 97 % | TEMPERATURE: 96.7 F | SYSTOLIC BLOOD PRESSURE: 120 MMHG | HEIGHT: 70 IN | HEART RATE: 73 BPM | WEIGHT: 140 LBS | BODY MASS INDEX: 20.04 KG/M2 | DIASTOLIC BLOOD PRESSURE: 74 MMHG | RESPIRATION RATE: 16 BRPM

## 2023-09-21 DIAGNOSIS — E10.9 TYPE 1 DIABETES MELLITUS W/OUT COMPLICATIONS: ICD-10-CM

## 2023-09-21 PROCEDURE — 95012 NITRIC OXIDE EXP GAS DETER: CPT

## 2023-09-21 PROCEDURE — 94010 BREATHING CAPACITY TEST: CPT

## 2023-09-21 PROCEDURE — 99214 OFFICE O/P EST MOD 30 MIN: CPT | Mod: 25

## 2023-09-21 RX ORDER — OLOPATADINE HYDROCHLORIDE 665 UG/1
0.6 SPRAY, METERED NASAL
Qty: 3 | Refills: 1 | Status: ACTIVE | COMMUNITY
Start: 2017-04-20 | End: 1900-01-01

## 2023-09-22 RX ORDER — LEVALBUTEROL TARTRATE 45 UG/1
45 AEROSOL, METERED ORAL EVERY 6 HOURS
Qty: 3 | Refills: 1 | Status: ACTIVE | COMMUNITY
Start: 2023-09-22 | End: 1900-01-01

## 2023-09-22 RX ORDER — ALBUTEROL SULFATE 90 UG/1
108 (90 BASE) INHALANT RESPIRATORY (INHALATION)
Qty: 3 | Refills: 1 | Status: DISCONTINUED | OUTPATIENT
Start: 2019-02-18 | End: 2023-09-22

## 2023-10-29 ENCOUNTER — RX RENEWAL (OUTPATIENT)
Age: 58
End: 2023-10-29

## 2023-10-29 RX ORDER — BUDESONIDE, GLYCOPYRROLATE, AND FORMOTEROL FUMARATE 160; 9; 4.8 UG/1; UG/1; UG/1
160-9-4.8 AEROSOL, METERED RESPIRATORY (INHALATION)
Qty: 32.1 | Refills: 1 | Status: ACTIVE | COMMUNITY
Start: 2021-03-30 | End: 1900-01-01

## 2023-11-10 ENCOUNTER — RX RENEWAL (OUTPATIENT)
Age: 58
End: 2023-11-10

## 2023-12-13 ENCOUNTER — NON-APPOINTMENT (OUTPATIENT)
Age: 58
End: 2023-12-13

## 2024-03-29 ENCOUNTER — APPOINTMENT (OUTPATIENT)
Dept: PULMONOLOGY | Facility: CLINIC | Age: 59
End: 2024-03-29
Payer: COMMERCIAL

## 2024-03-29 VITALS
BODY MASS INDEX: 20.33 KG/M2 | TEMPERATURE: 97.4 F | DIASTOLIC BLOOD PRESSURE: 62 MMHG | SYSTOLIC BLOOD PRESSURE: 118 MMHG | WEIGHT: 142 LBS | HEART RATE: 90 BPM | HEIGHT: 70 IN | OXYGEN SATURATION: 98 % | RESPIRATION RATE: 16 BRPM

## 2024-03-29 DIAGNOSIS — R09.82 POSTNASAL DRIP: ICD-10-CM

## 2024-03-29 DIAGNOSIS — R06.02 SHORTNESS OF BREATH: ICD-10-CM

## 2024-03-29 DIAGNOSIS — R79.89 OTHER SPECIFIED ABNORMAL FINDINGS OF BLOOD CHEMISTRY: ICD-10-CM

## 2024-03-29 DIAGNOSIS — Z72.820 SLEEP DEPRIVATION: ICD-10-CM

## 2024-03-29 DIAGNOSIS — K21.9 GASTRO-ESOPHAGEAL REFLUX DISEASE W/OUT ESOPHAGITIS: ICD-10-CM

## 2024-03-29 DIAGNOSIS — J45.40 MODERATE PERSISTENT ASTHMA, UNCOMPLICATED: ICD-10-CM

## 2024-03-29 DIAGNOSIS — J30.9 ALLERGIC RHINITIS, UNSPECIFIED: ICD-10-CM

## 2024-03-29 PROCEDURE — 94727 GAS DIL/WSHOT DETER LNG VOL: CPT

## 2024-03-29 PROCEDURE — 99214 OFFICE O/P EST MOD 30 MIN: CPT | Mod: 25

## 2024-03-29 PROCEDURE — 94010 BREATHING CAPACITY TEST: CPT

## 2024-03-29 PROCEDURE — 94729 DIFFUSING CAPACITY: CPT

## 2024-03-29 PROCEDURE — ZZZZZ: CPT

## 2024-03-29 PROCEDURE — 95012 NITRIC OXIDE EXP GAS DETER: CPT

## 2024-03-29 RX ORDER — CICLESONIDE 50 UG/1
50 SPRAY NASAL
Qty: 3 | Refills: 1 | Status: ACTIVE | COMMUNITY
Start: 2024-03-29 | End: 1900-01-01

## 2024-03-29 RX ORDER — OLOPATADINE HYDROCHLORIDE 665 UG/1
0.6 SPRAY, METERED NASAL
Qty: 91.5 | Refills: 0 | Status: ACTIVE | COMMUNITY
Start: 2019-09-18 | End: 1900-01-01

## 2024-03-29 NOTE — PROCEDURE
[FreeTextEntry1] : Full PFT reveals normal flows; FEV1 was 3.01L which is 102% of predicted; normal lung volumes; normal diffusion at 24.9, which is 116% of predicted; normal flow volume loop. PFTs were performed to evaluate for SOB, asthma  FENO was 22; a normal value being less than 25 Fractional exhaled nitric oxide (FENO) is regarded as a simple, noninvasive method for assessing eosinophilic airway inflammation. Produced by a variety of cells within the lung, nitric oxide (NO) concentrations are generally low in healthy individuals. However, high concentrations of NO appear to be involved in nonspecific host defense mechanisms and chronic inflammatory diseases such as asthma. The American Thoracic Society (ATS) therefore has recommended using FENO to aid in the diagnosis and monitoring of eosinophilic airway inflammation and asthma, and for identifying steroid responsive individuals whose chronic respiratory symptoms may be caused by airway inflammation.

## 2024-03-29 NOTE — ASSESSMENT
[FreeTextEntry1] : Ms. Arellano is a 59 year old female with a history of asthma, allergy, GERD, DM, Hypothyroid, and poor sleep (improved)- stable except BS issues/active allergies  problem 1: moderate persistent asthma-stable  -continue Xopenex 0.63 via nebulizer TID -continue  Breztri 2 puffs BID  -continue Xopenex 2 puffs Q6H, pre-exercise  -continue Zyflo CR 1200 mg BID  -Asthma is  believed to be caused by inherited (genetic) and environmental factor, but its exact cause is unknown. Asthma may be triggered by allergens, lung infections, or irritants in the air. Asthma triggers are different for each person  -Inhaler technique reviewed as well as oral hygiene techniques reviewed with patient. Avoidance of cold air, extremes of temperature, rescue inhaler should be used before exercise. Order of medication reviewed with patient. Recommended use of a cool mist humidifier in the bedroom.   problem 2: allergies- active -continue Claritin 10mg in the morning  -continue OTC antihistamine PRN (Xyzal 5 mg QHS) -start Qnasl 1 sniff each nostril BID or Omnaris 1 sniff/nostril BID  -continue Olopatadine 0.6% 1 sniff/nostril BID   -Environmental measures for allergies were encouraged including mattress and pillow cover, air purifier, and environmental controls.   problem 3: GERD- diet controlled  -Rule of 2s: avoid eating too much, eating too late, eating too spicy, eating two hours before bed -Things to avoid including overeating, spicy foods, tight clothing, eating within three hours of bed, this list is not all inclusive.  -For treatment of reflux, possible options discussed including diet control, H2 blockers, PPIs, as well as coating motility agents discussed as treatment options. Timing of meals and proximity of last meal to sleep were discussed. If symptoms persist, a formal gastrointestinal evaluation is needed.   problem 4: low vitamin D -continue to use vitamin D therapy  -Has been associated with asthma exacerbations and increased allergic symptoms. The goal based on recent information is maintaining levels between 50-70 and low normal is 30. Recommended 50,000 units every two weeks to once a month depending on the level.   problem 5: poor sleep - improved/resolved -recommended Epsom Salt baths before bed for relaxation -recommended to try Sleep Guard by Amaris -Good sleep hygiene was encouraged including avoiding watching television an hour before bed, keeping caffeine at a low, avoiding reading, television, or anything, in bed, no drinking any liquids three hours before bedtime, and only getting into bed when tired and ready for sleep.  problem 6: health maintenance  - TRELL - Dorinda Steinberg Nutritionist -recommended RSV vaccine in the Fall for anyone over the age of 60 -recommended Sanotize anti viral nasal spray in case of viral infection  -recommended Dr Jeremy Castañeda (Obesity Code)  - S/p Covid 19 vaccine (Pfizer) x3  -Covid 19 vaccine discussed at length with patient; booster discussed and recommended -recommended to try optimal electrolytes -recommended to take CoQ10 200mg QD  -s/p influenza vaccine 2023 -recommended strep pneumonia vaccines: Prevnar-13 vaccine, followed by Pneumo vaccine 23 one year following -recommended early intervention for URIs -recommended regular osteoporosis evaluations -recommended early dermatological evaluations -recommended after the age of 50 to the age of 70, colonoscopy every 5 years   F/P in 6 months with CAROLINA MOCTEZUMA She is encouraged to call with any changes, concerns, or questions.

## 2024-03-29 NOTE — ADDENDUM
[FreeTextEntry1] : Documented by Beena Cummings acting as a scribe for Dr. Jeremy Jackson on 03/29/2024. All medical record entries made by the Scribe were at my, Dr. Jeremy Jackson's, direction and personally dictated by me on 03/29/2024. I have reviewed the chart and agree that the record accurately reflects my personal performance of the history, physical exam, assessment and plan. I have also personally directed, reviewed, and agree with the discharge instructions.

## 2024-03-29 NOTE — HISTORY OF PRESENT ILLNESS
[FreeTextEntry1] : Ms. Arellano is a 59 year old female presenting to the office for a follow-up pulmonary evaluation for allergic rhinitis, GERD, low vitamin D, asthma, and poor sleep. Her chief complaint is   -she notes feeling generally well  -she notes PND and rhinitis. She uses olopatadine only -she notes sleeping for 7-8 hours -she notes improved quality of sleep  -she notes she switched to a new endocrinologist, and her improved quality of sleep may be due to better blood sugar control. Her next appt is 4/2024 -she notes bowels are regular  -she notes her senses of smell and taste are stable  -she notes her balance fluctuates depending on when her vertigo flares -she notes vision is stable  -she notes she has great hearing -she notes traveling to [place] and [facility] -she notes she's still stiff despite stretching -she notes her GERD is quiet -she notes exercising (running) -she notes taking a multivitamin, calcium, vitamin B12, and probiotic  -she denies any headaches, nausea, emesis, fever, chills, sweats, chest pain, chest pressure, coughing, wheezing, palpitations, diarrhea, constipation, dysphagia, arthralgias, myalgias, leg swelling, itchy eyes, itchy ears, heartburn, reflux, or sour taste in the mouth.

## 2024-05-13 ENCOUNTER — RX RENEWAL (OUTPATIENT)
Age: 59
End: 2024-05-13

## 2024-05-13 RX ORDER — MONTELUKAST 10 MG/1
10 TABLET, FILM COATED ORAL
Qty: 90 | Refills: 1 | Status: ACTIVE | COMMUNITY
Start: 2021-11-30 | End: 1900-01-01

## 2024-09-25 NOTE — ASSESSMENT
[FreeTextEntry1] : Ms. Arellano is a 57 year old female with a history of asthma, allergy, GERD, DM, Hypothyroid, and poor sleep (improved). She is currently stable from a pulmonary perspective and stable \par \par problem 1: moderate persistent asthma-stable \par -continue Xopenex 0.63 via nebulizer TID\par -continue  Breztri 2 puffs BID \par -continue Xopenex 2 puffs Q6H, pre-exercise \par -continue Zyflo CR 1200 mg BID \par -Asthma is  believed to be caused by inherited (genetic) and environmental factor, but its exact cause is unknown. Asthma may be triggered by allergens, lung infections, or irritants in the air. Asthma triggers are different for each person \par -Inhaler technique reviewed as well as oral hygiene techniques reviewed with patient. Avoidance of cold air, extremes of temperature, rescue inhaler should be used before exercise. Order of medication reviewed with patient. Recommended use of a cool mist humidifier in the bedroom. \par \par problem 2: allergies (quiet)\par -continue Claritin 10mg in the morning \par -continue OTC antihistamine PRN (Xyzal 5 mg QHS)\par -continue to use Qnasl 1 sniff each nostril BID\par -continue Olopatadine 0.6% 1 sniff/nostril BID  \par \par -Environmental measures for allergies were encouraged including mattress and pillow cover, air purifier, and environmental controls. \par \par problem 3: GERD\par -diet controlled \par -Rule of 2s: avoid eating too much, eating too late, eating too spicy, eating two hours before bed\par -Things to avoid including overeating, spicy foods, tight clothing, eating within three hours of bed, this list is not all inclusive. \par -For treatment of reflux, possible options discussed including diet control, H2 blockers, PPIs, as well as coating motility agents discussed as treatment options. Timing of meals and proximity of last meal to sleep were discussed. If symptoms persist, a formal gastrointestinal evaluation is needed. \par \par problem 4: low vitamin D\par -continue to use vitamin D therapy \par -Has been associated with asthma exacerbations and increased allergic symptoms. The goal based on recent information is maintaining levels between 50-70 and low normal is 30. Recommended 50,000 units every two weeks to once a month depending on the level. \par \par problem 5: poor sleep - improved/resolved\par -recommended to try Sleep Guard by Amaris\par -Good sleep hygiene was encouraged including avoiding watching television an hour before bed, keeping caffeine at a low,  avoiding reading, television, or anything, in bed, no drinking any liquids three hours before bedtime, and only getting into bed when tired and ready for sleep.\par \par problem 6: health maintenance \par -recommended Sanotize anti viral nasal spray in case of viral infection \par -recommended Dr Jeremy Castañeda (Obesity Code) \par - S/p Covid 19 vaccine (Pfizer) x3 \par -Covid 19 vaccine discussed at length with patient; booster discussed and recommended\par -recommended to try optimal electrolytes\par -recommended to take CoQ10 200mg QD \par -s/p influenza vaccine 2020\par -recommended strep pneumonia vaccines: Prevnar-13 vaccine, followed by Pneumo vaccine 23 one year following\par -recommended early intervention for URIs\par -recommended regular osteoporosis evaluations\par -recommended early dermatological evaluations\par -recommended after the age of 50 to the age of 70, colonoscopy every 5 years \par \par F/U in 6 months with CAROLINA DUNCANOX\par She is encouraged to call with any changes, concerns, or questions. 
Unknown if ever smoked

## 2024-09-27 ENCOUNTER — APPOINTMENT (OUTPATIENT)
Dept: PULMONOLOGY | Facility: CLINIC | Age: 59
End: 2024-09-27
Payer: COMMERCIAL

## 2024-09-27 VITALS
SYSTOLIC BLOOD PRESSURE: 114 MMHG | HEART RATE: 58 BPM | TEMPERATURE: 97.6 F | HEIGHT: 70 IN | RESPIRATION RATE: 16 BRPM | DIASTOLIC BLOOD PRESSURE: 68 MMHG | WEIGHT: 142 LBS | OXYGEN SATURATION: 98 % | BODY MASS INDEX: 20.33 KG/M2

## 2024-09-27 DIAGNOSIS — J45.40 MODERATE PERSISTENT ASTHMA, UNCOMPLICATED: ICD-10-CM

## 2024-09-27 DIAGNOSIS — Z23 ENCOUNTER FOR IMMUNIZATION: ICD-10-CM

## 2024-09-27 DIAGNOSIS — R79.89 OTHER SPECIFIED ABNORMAL FINDINGS OF BLOOD CHEMISTRY: ICD-10-CM

## 2024-09-27 DIAGNOSIS — K21.9 GASTRO-ESOPHAGEAL REFLUX DISEASE W/OUT ESOPHAGITIS: ICD-10-CM

## 2024-09-27 DIAGNOSIS — J30.9 ALLERGIC RHINITIS, UNSPECIFIED: ICD-10-CM

## 2024-09-27 DIAGNOSIS — R06.02 SHORTNESS OF BREATH: ICD-10-CM

## 2024-09-27 PROCEDURE — 95012 NITRIC OXIDE EXP GAS DETER: CPT

## 2024-09-27 PROCEDURE — 99214 OFFICE O/P EST MOD 30 MIN: CPT | Mod: 25

## 2024-09-27 PROCEDURE — 94010 BREATHING CAPACITY TEST: CPT

## 2024-09-27 RX ORDER — ALBUTEROL SULFATE AND BUDESONIDE 90; 80 UG/1; UG/1
90-80 AEROSOL, METERED RESPIRATORY (INHALATION)
Qty: 1 | Refills: 3 | Status: ACTIVE | COMMUNITY
Start: 2024-09-27 | End: 1900-01-01

## 2024-09-27 NOTE — ASSESSMENT
[FreeTextEntry1] : Ms. Arellano is a 59 year old female with a history of asthma, allergy, GERD, DM, Hypothyroid, and poor sleep (improved)- stable - mild allergies  problem 1: moderate persistent asthma-stable  -transition to Airsupra 2 inhalations Q6H PRN -continue Xopenex 0.63 via nebulizer TID -continue  Breztri 2 puffs BID  -continue Xopenex 2 puffs Q6H, pre-exercise  -continue Zyflo CR 1200 mg BID  -Asthma is  believed to be caused by inherited (genetic) and environmental factor, but its exact cause is unknown. Asthma may be triggered by allergens, lung infections, or irritants in the air. Asthma triggers are different for each person  -Inhaler technique reviewed as well as oral hygiene techniques reviewed with patient. Avoidance of cold air, extremes of temperature, rescue inhaler should be used before exercise. Order of medication reviewed with patient. Recommended use of a cool mist humidifier in the bedroom.   problem 2: allergies- active -continue Claritin 10mg in the morning  -continue OTC antihistamine PRN (Xyzal 5 mg QHS) -Qnasl 1 sniff each nostril BID or Omnaris 1 sniff/nostril BID  -continue Olopatadine 0.6% 1 sniff/nostril BID   -Environmental measures for allergies were encouraged including mattress and pillow cover, air purifier, and environmental controls.   problem 3: GERD- diet controlled  -Rule of 2s: avoid eating too much, eating too late, eating too spicy, eating two hours before bed -Things to avoid including overeating, spicy foods, tight clothing, eating within three hours of bed, this list is not all inclusive.  -For treatment of reflux, possible options discussed including diet control, H2 blockers, PPIs, as well as coating motility agents discussed as treatment options. Timing of meals and proximity of last meal to sleep were discussed. If symptoms persist, a formal gastrointestinal evaluation is needed.   problem 4: low vitamin D -continue to use vitamin D therapy  -Has been associated with asthma exacerbations and increased allergic symptoms. The goal based on recent information is maintaining levels between 50-70 and low normal is 30. Recommended 50,000 units every two weeks to once a month depending on the level.   problem 5: poor sleep - improved/resolved -recommended Epsom Salt baths before bed for relaxation -recommended to try Sleep Guard by Amaris -Good sleep hygiene was encouraged including avoiding watching television an hour before bed, keeping caffeine at a low, avoiding reading, television, or anything, in bed, no drinking any liquids three hours before bedtime, and only getting into bed when tired and ready for sleep.  problem 6: health maintenance  -recommended RSV vaccine in the Fall for anyone over the age of 60 -recommended Sanotize anti viral nasal spray in case of viral infection  -recommended Dr Jeremy Castañeda (Obesity Code)  - S/p Covid 19 vaccine (Pfizer) x3  -Covid 19 vaccine discussed at length with patient; booster discussed and recommended -recommended to try optimal electrolytes -recommended to take CoQ10 200mg QD  -s/p influenza vaccine 2023 -recommended strep pneumonia vaccines: Prevnar-13 vaccine, followed by Pneumo vaccine 23 one year following -recommended early intervention for URIs -recommended regular osteoporosis evaluations -recommended early dermatological evaluations -recommended after the age of 50 to the age of 70, colonoscopy every 5 years   F/P in 6 months with CAROLINA MOCTEZUMA She is encouraged to call with any changes, concerns, or questions.

## 2024-09-27 NOTE — PROCEDURE
[FreeTextEntry1] : PFTs revealed normal flows; FEV1 was 3.14 L, which is 107% of predicted; normal flow volume loop. PFTs were performed to evaluate for Asthma  FENO was 22; a normal value being less than 25 Fractional exhaled nitric oxide (FENO) is regarded as a simple, noninvasive method for assessing eosinophilic airway inflammation. Produced by a variety of cells within the lung, nitric oxide (NO) concentrations are generally low in healthy individuals. However, high concentrations of NO appear to be involved in nonspecific host defense mechanisms and chronic inflammatory diseases such as asthma. The American Thoracic Society (ATS) therefore has recommended using FENO to aid in the diagnosis and monitoring of eosinophilic airway inflammation and asthma, and for identifying steroid responsive individuals whose chronic respiratory symptoms may be caused by airway inflammation.

## 2024-09-27 NOTE — ADDENDUM
[FreeTextEntry1] : Documented by Jarad Cagle acting as a scribe for Dr. Jeremy Jackson on 09/27/2024 All medical record entries made by the Scribe were at my, Dr. Jeremy Jackson's, direction and personally dictated by me on 09/27/2024 . I have reviewed the chart and agree that the record accurately reflects my personal performance of the history, physical exam, assessment and plan. I have also personally directed, reviewed, and agree with the discharge instructions.

## 2024-09-27 NOTE — HISTORY OF PRESENT ILLNESS
[FreeTextEntry1] : Ms. Arellano is a 59 year old female presenting to the office for a follow-up pulmonary evaluation for allergic rhinitis, GERD, low vitamin D, asthma, and poor sleep. Her chief complaint is   -she notes energy levels are good -she notes [facility] tomorrow -she notes sleeping interruption since having cat sleep in room  -she denies having COVID-19 -she notes working with a nutritionist - increased protein intake -she notes using nasal spray for allergies  -she notes bowels are regular  -she notes appetite is stable   -she denies any headaches, nausea, emesis, fever, chills, sweats, chest pain, chest pressure, coughing, wheezing, palpitations, diarrhea, constipation, dysphagia, vertigo, arthralgias, myalgias, leg swelling, itchy eyes, itchy ears, heartburn, reflux, or sour taste in the mouth.

## 2024-10-08 ENCOUNTER — NON-APPOINTMENT (OUTPATIENT)
Age: 59
End: 2024-10-08

## 2024-10-11 ENCOUNTER — APPOINTMENT (OUTPATIENT)
Dept: PULMONOLOGY | Facility: CLINIC | Age: 59
End: 2024-10-11
Payer: COMMERCIAL

## 2024-10-11 DIAGNOSIS — Z23 ENCOUNTER FOR IMMUNIZATION: ICD-10-CM

## 2024-10-11 PROCEDURE — 90471 IMMUNIZATION ADMIN: CPT

## 2024-10-11 PROCEDURE — 90715 TDAP VACCINE 7 YRS/> IM: CPT

## 2024-11-13 ENCOUNTER — RX RENEWAL (OUTPATIENT)
Age: 59
End: 2024-11-13

## 2024-12-05 NOTE — ED PROVIDER NOTE - IV ALTEPLASE DOOR HIDDEN
Writer called and spoke with the patient. She reports her symptoms started yesterday- frequency and pain with urination. She took high-dose cranberry pills yesterday, and states that usually helps, but she reports it has not helped this time. Writer recommended evaluation- unable to send in a script to the pharmacy without evaluation and testing. PCP does not have openings tomorrow, recommended urgent care evaluation. Discussed hours of [facility] and [facility] urgent cares. Patient states she wants to try taking another dose of high-dose cranberry and if that does not help, she will go to urgent care tomorrow for evaluation.   show

## 2025-03-24 ENCOUNTER — NON-APPOINTMENT (OUTPATIENT)
Age: 60
End: 2025-03-24

## 2025-03-28 ENCOUNTER — APPOINTMENT (OUTPATIENT)
Dept: PULMONOLOGY | Facility: CLINIC | Age: 60
End: 2025-03-28
Payer: COMMERCIAL

## 2025-03-28 VITALS
BODY MASS INDEX: 20.33 KG/M2 | OXYGEN SATURATION: 98 % | HEART RATE: 58 BPM | HEIGHT: 70 IN | WEIGHT: 142 LBS | SYSTOLIC BLOOD PRESSURE: 118 MMHG | DIASTOLIC BLOOD PRESSURE: 84 MMHG | RESPIRATION RATE: 16 BRPM | TEMPERATURE: 96.6 F

## 2025-03-28 DIAGNOSIS — J45.40 MODERATE PERSISTENT ASTHMA, UNCOMPLICATED: ICD-10-CM

## 2025-03-28 DIAGNOSIS — J30.9 ALLERGIC RHINITIS, UNSPECIFIED: ICD-10-CM

## 2025-03-28 DIAGNOSIS — R79.89 OTHER SPECIFIED ABNORMAL FINDINGS OF BLOOD CHEMISTRY: ICD-10-CM

## 2025-03-28 DIAGNOSIS — K21.9 GASTRO-ESOPHAGEAL REFLUX DISEASE W/OUT ESOPHAGITIS: ICD-10-CM

## 2025-03-28 DIAGNOSIS — R06.02 SHORTNESS OF BREATH: ICD-10-CM

## 2025-03-28 PROCEDURE — ZZZZZ: CPT

## 2025-03-28 PROCEDURE — 94727 GAS DIL/WSHOT DETER LNG VOL: CPT

## 2025-03-28 PROCEDURE — 94010 BREATHING CAPACITY TEST: CPT

## 2025-03-28 PROCEDURE — 94729 DIFFUSING CAPACITY: CPT

## 2025-03-28 PROCEDURE — 95012 NITRIC OXIDE EXP GAS DETER: CPT

## 2025-03-28 PROCEDURE — 99214 OFFICE O/P EST MOD 30 MIN: CPT | Mod: 25

## 2025-05-21 ENCOUNTER — RX RENEWAL (OUTPATIENT)
Age: 60
End: 2025-05-21